# Patient Record
Sex: FEMALE | Race: WHITE | NOT HISPANIC OR LATINO | Employment: OTHER | URBAN - METROPOLITAN AREA
[De-identification: names, ages, dates, MRNs, and addresses within clinical notes are randomized per-mention and may not be internally consistent; named-entity substitution may affect disease eponyms.]

---

## 2017-01-11 ENCOUNTER — LAB (OUTPATIENT)
Dept: LAB | Facility: HOSPITAL | Age: 62
End: 2017-01-11

## 2017-01-11 DIAGNOSIS — Z79.899 ENCOUNTER FOR LONG-TERM (CURRENT) USE OF MEDICATIONS: Primary | ICD-10-CM

## 2017-01-11 DIAGNOSIS — IMO0002 HORMONE DEFICIENCY: ICD-10-CM

## 2017-01-11 DIAGNOSIS — L70.9 ACNE, UNSPECIFIED ACNE TYPE: ICD-10-CM

## 2017-01-11 LAB
ALBUMIN SERPL-MCNC: 4.4 G/DL (ref 3.2–4.8)
ALP SERPL-CCNC: 59 U/L (ref 25–100)
ALT SERPL W P-5'-P-CCNC: 18 U/L (ref 7–40)
AST SERPL-CCNC: 28 U/L (ref 0–33)
BILIRUB CONJ SERPL-MCNC: 0.1 MG/DL (ref 0–0.2)
BILIRUB INDIRECT SERPL-MCNC: 0.4 MG/DL (ref 0.1–1.1)
BILIRUB SERPL-MCNC: 0.5 MG/DL (ref 0.3–1.2)
CHOLEST SERPL-MCNC: 188 MG/DL (ref 0–200)
ESTRADIOL SERPL HS-MCNC: 21 PG/ML
FSH SERPL-ACNC: 143.7 MIU/ML
HCG INTACT+B SERPL-ACNC: <5 MIU/ML
PROGEST SERPL-MCNC: 0.42 NG/ML
PROT SERPL-MCNC: 7.2 G/DL (ref 5.7–8.2)
TRIGL SERPL-MCNC: 46 MG/DL (ref 0–150)

## 2017-01-11 PROCEDURE — 82670 ASSAY OF TOTAL ESTRADIOL: CPT | Performed by: SPECIALIST

## 2017-01-11 PROCEDURE — 80076 HEPATIC FUNCTION PANEL: CPT | Performed by: SPECIALIST

## 2017-01-11 PROCEDURE — 83001 ASSAY OF GONADOTROPIN (FSH): CPT | Performed by: SPECIALIST

## 2017-01-11 PROCEDURE — 84478 ASSAY OF TRIGLYCERIDES: CPT | Performed by: SPECIALIST

## 2017-01-11 PROCEDURE — 84144 ASSAY OF PROGESTERONE: CPT | Performed by: SPECIALIST

## 2017-01-11 PROCEDURE — 82465 ASSAY BLD/SERUM CHOLESTEROL: CPT | Performed by: SPECIALIST

## 2017-01-11 PROCEDURE — 36415 COLL VENOUS BLD VENIPUNCTURE: CPT

## 2017-01-11 PROCEDURE — 84702 CHORIONIC GONADOTROPIN TEST: CPT | Performed by: SPECIALIST

## 2017-01-26 ENCOUNTER — PROCEDURE VISIT (OUTPATIENT)
Dept: OBSTETRICS AND GYNECOLOGY | Facility: CLINIC | Age: 62
End: 2017-01-26

## 2017-01-26 VITALS
SYSTOLIC BLOOD PRESSURE: 118 MMHG | WEIGHT: 118 LBS | HEIGHT: 65 IN | DIASTOLIC BLOOD PRESSURE: 62 MMHG | BODY MASS INDEX: 19.66 KG/M2

## 2017-01-26 DIAGNOSIS — Z01.419 ENCOUNTER FOR GYNECOLOGICAL EXAMINATION WITHOUT ABNORMAL FINDING: Primary | ICD-10-CM

## 2017-01-26 PROCEDURE — 99396 PREV VISIT EST AGE 40-64: CPT | Performed by: OBSTETRICS & GYNECOLOGY

## 2017-01-26 NOTE — PROGRESS NOTES
Subjective   Chief Complaint   Patient presents with   • Gynecologic Exam   • Cyst     patient advised has cyst in vaginal area x 5 days, has been draining.      Brea Perez is a 61 y.o. year old  menopausal female presenting to be seen for her annual exam.  This past year she has not been on hormone replacement therapy.  There has not been vaginal bleeding in the last 12 months.  Menopausal symptoms are not present.  Pt with complaints of vulvar lesions that come and go intermittently; will vary from side to side.  Pt recently had lesion but has resolved.  Pt reports usually pea size with drainage but no blisters or vesicles.  Pt see's Dr. Mcbride.  Pt on Osphena now since September and reports doing well.  Pt had MMG done in November; reviewed and normal.  Pt also had dexa; reviewed with borderline osteopenia.  Pt on calcium and vit. D.      HEALTH Hx:  She exercises regularly: not asked.  She wears her seat belt:yes.  She has concerns about domestic violence: no.  She has noticed changes in height: no.  OTHER COMPLAINTS:  none    The following portions of the patient's history were reviewed and updated as appropriate:problem list, current medications, allergies, past family history, past medical history, past social history and past surgical history.    Smoking status: Former Smoker                                                              Packs/day: 0.50      Years: 10.00        Quit date: 1986  Smokeless status: Never Used                          Review of Systems  Consitutional NEG: anorexia or night sweats    POS: nothing reported   Gastointestinal NEG: bloating, change in bowel habits, melena or reflux symptoms    POS: nothing reported   Genitourinary NEG: dysuria or hematuria    POS: nothing reported   Integument NEG: moles that are changing in size, shape, color or rashes    POS: nothing reported   Breast NEG: persistent breast lump, skin dimpling or nipple discharge    POS: nothing  "reported        Objective   Visit Vitals   • /62   • Ht 65\" (165.1 cm)   • Wt 118 lb (53.5 kg)   • LMP  (Exact Date)   • Breastfeeding No   • BMI 19.64 kg/m2       General:  well developed; well nourished  no acute distress   Skin:  No suspicious lesions seen   Thyroid: normal to inspection and palpation   Breasts:  Examined in supine position  Symmetric without masses or skin dimpling  Nipples normal without inversion, lesions or discharge  There are no palpable axillary nodes   Abdomen: soft, non-tender; no masses  no umbilical or inginual hernias are present  no hepato-splenomegaly   Pelvis: Clinical staff was present for exam  External genitalia:  normal appearance of the external genitalia including Bartholin's and Lawton's glands.  :  urethral meatus normal; urethral hypermobility is absent.  Vaginal:  normal pink mucosa without prolapse or lesions.  Cervix:  normal appearance.  Uterus:  normal size, shape and consistency.  Adnexa:  normal bimanual exam of the adnexa.        Assessment /Plan    Brea was seen today for gynecologic exam and cyst.    Diagnoses and all orders for this visit:    Encounter for gynecological examination without abnormal finding  -     Pap IG, Rfx HPV ASCU     MMG done and reviewed as well as dexa.   Discussed calcium and vit d.    Follow up 1 year for annual exam           This note was electronically signed.    Odalys Agudelo M.D.  January 26, 2017      "

## 2017-01-26 NOTE — PATIENT INSTRUCTIONS
Menopause  Menopause is the normal time of life when menstrual periods stop completely. Menopause is complete when you have missed 12 consecutive menstrual periods. It usually occurs between the ages of 48 years and 55 years. Very rarely does a woman develop menopause before the age of 40 years. At menopause, your ovaries stop producing the female hormones estrogen and progesterone. This can cause undesirable symptoms and also affect your health. Sometimes the symptoms may occur 4-5 years before the menopause begins. There is no relationship between menopause and:  · Oral contraceptives.  · Number of children you had.  · Race.  · The age your menstrual periods started (menarche).  Heavy smokers and very thin women may develop menopause earlier in life.  CAUSES  · The ovaries stop producing the female hormones estrogen and progesterone.  · Other causes include:    Surgery to remove both ovaries.    The ovaries stop functioning for no known reason.    Tumors of the pituitary gland in the brain.    Medical disease that affects the ovaries and hormone production.    Radiation treatment to the abdomen or pelvis.    Chemotherapy that affects the ovaries.  SYMPTOMS   · Hot flashes.  · Night sweats.  · Decrease in sex drive.  · Vaginal dryness and thinning of the vagina causing painful intercourse.  · Dryness of the skin and developing wrinkles.  · Headaches.  · Tiredness.  · Irritability.  · Memory problems.  · Weight gain.  · Bladder infections.  · Hair growth of the face and chest.  · Infertility.  More serious symptoms include:  · Loss of bone (osteoporosis) causing breaks (fractures).  · Depression.  · Hardening and narrowing of the arteries (atherosclerosis) causing heart attacks and strokes.  DIAGNOSIS   · When the menstrual periods have stopped for 12 straight months.  · Physical exam.  · Hormone studies of the blood.  TREATMENT   There are many treatment choices and nearly as many questions about them. The  decisions to treat or not to treat menopausal changes is an individual choice made with your health care provider. Your health care provider can discuss the treatments with you. Together, you can decide which treatment will work best for you. Your treatment choices may include:   · Hormone therapy (estrogen and progesterone).  · Non-hormonal medicines.  · Treating the individual symptoms with medicine (for example antidepressants for depression).  · Herbal medicines that may help specific symptoms.  · Counseling by a psychiatrist or psychologist.  · Group therapy.  · Lifestyle changes including:    Eating healthy.    Regular exercise.    Limiting caffeine and alcohol.    Stress management and meditation.  · No treatment.  HOME CARE INSTRUCTIONS   · Take the medicine your health care provider gives you as directed.  · Get plenty of sleep and rest.  · Exercise regularly.  · Eat a diet that contains calcium (good for the bones) and soy products (acts like estrogen hormone).  · Avoid alcoholic beverages.  · Do not smoke.  · If you have hot flashes, dress in layers.  · Take supplements, calcium, and vitamin D to strengthen bones.  · You can use over-the-counter lubricants or moisturizers for vaginal dryness.  · Group therapy is sometimes very helpful.  · Acupuncture may be helpful in some cases.  SEEK MEDICAL CARE IF:   · You are not sure you are in menopause.  · You are having menopausal symptoms and need advice and treatment.  · You are still having menstrual periods after age 55 years.  · You have pain with intercourse.  · Menopause is complete (no menstrual period for 12 months) and you develop vaginal bleeding.  · You need a referral to a specialist (gynecologist, psychiatrist, or psychologist) for treatment.  SEEK IMMEDIATE MEDICAL CARE IF:   · You have severe depression.  · You have excessive vaginal bleeding.  · You fell and think you have a broken bone.  · You have pain when you urinate.  · You develop leg or  chest pain.  · You have a fast pounding heart beat (palpitations).  · You have severe headaches.  · You develop vision problems.  · You feel a lump in your breast.  · You have abdominal pain or severe indigestion.     This information is not intended to replace advice given to you by your health care provider. Make sure you discuss any questions you have with your health care provider.     Document Released: 03/09/2005 Document Revised: 08/20/2014 Document Reviewed: 07/17/2014  [employer] Interactive Patient Education ©2016 [employer]

## 2017-01-26 NOTE — MR AVS SNAPSHOT
Brea Perez   1/26/2017 1:30 PM   Procedure visit    Dept Phone:  [phone]   Encounter #:  [id number]    Provider:  Odalys Agudelo MD   Department:  [facility]                Your Full Care Plan              Your Updated Medication List          This list is accurate as of: 1/26/17  2:16 PM.  Always use your most recent med list.                ABSORICA 10 MG capsule   Generic drug:  ISOtretinoin       busPIRone 7.5 MG tablet   Commonly known as:  BUSPAR   Take 1 tablet by mouth 2 (two) times a day.       CALCIUM 600 + D PO       DULoxetine 60 MG capsule   Commonly known as:  CYMBALTA   take 1 capsule by mouth twice a day       * OCUVITE ADULT 50+ PO       * CENTRUM SILVER ADULT 50+ PO       Omega-3 350 MG capsule delayed-release       Ospemifene 60 MG tablet   Take 1 tablet by mouth daily.       traZODone 100 MG tablet   Commonly known as:  DESYREL   take 1 tablet by mouth at bedtime if needed       vitamin B-12 1000 MCG tablet   Commonly known as:  CYANOCOBALAMIN       * Notice:  This list has 2 medication(s) that are the same as other medications prescribed for you. Read the directions carefully, and ask your doctor or other care provider to review them with you.            We Performed the Following     Pap IG, Rfx HPV ASCU       You Were Diagnosed With        Codes Comments    Encounter for gynecological examination without abnormal finding    -  Primary ICD-10-CM: Z01.419  ICD-9-CM: V72.31       Instructions    Menopause  Menopause is the normal time of life when menstrual periods stop completely. Menopause is complete when you have missed 12 consecutive menstrual periods. It usually occurs between the ages of 48 years and 55 years. Very rarely does a woman develop menopause before the age of 40 years. At menopause, your ovaries stop producing the female hormones estrogen and progesterone. This can cause undesirable symptoms and also  affect your health. Sometimes the symptoms may occur 4-5 years before the menopause begins. There is no relationship between menopause and:  · Oral contraceptives.  · Number of children you had.  · Race.  · The age your menstrual periods started (menarche).  Heavy smokers and very thin women may develop menopause earlier in life.  CAUSES  · The ovaries stop producing the female hormones estrogen and progesterone.  · Other causes include:    Surgery to remove both ovaries.    The ovaries stop functioning for no known reason.    Tumors of the pituitary gland in the brain.    Medical disease that affects the ovaries and hormone production.    Radiation treatment to the abdomen or pelvis.    Chemotherapy that affects the ovaries.  SYMPTOMS   · Hot flashes.  · Night sweats.  · Decrease in sex drive.  · Vaginal dryness and thinning of the vagina causing painful intercourse.  · Dryness of the skin and developing wrinkles.  · Headaches.  · Tiredness.  · Irritability.  · Memory problems.  · Weight gain.  · Bladder infections.  · Hair growth of the face and chest.  · Infertility.  More serious symptoms include:  · Loss of bone (osteoporosis) causing breaks (fractures).  · Depression.  · Hardening and narrowing of the arteries (atherosclerosis) causing heart attacks and strokes.  DIAGNOSIS   · When the menstrual periods have stopped for 12 straight months.  · Physical exam.  · Hormone studies of the blood.  TREATMENT   There are many treatment choices and nearly as many questions about them. The decisions to treat or not to treat menopausal changes is an individual choice made with your health care provider. Your health care provider can discuss the treatments with you. Together, you can decide which treatment will work best for you. Your treatment choices may include:   · Hormone therapy (estrogen and progesterone).  · Non-hormonal medicines.  · Treating the individual symptoms with medicine (for example antidepressants for  depression).  · Herbal medicines that may help specific symptoms.  · Counseling by a psychiatrist or psychologist.  · Group therapy.  · Lifestyle changes including:    Eating healthy.    Regular exercise.    Limiting caffeine and alcohol.    Stress management and meditation.  · No treatment.  HOME CARE INSTRUCTIONS   · Take the medicine your health care provider gives you as directed.  · Get plenty of sleep and rest.  · Exercise regularly.  · Eat a diet that contains calcium (good for the bones) and soy products (acts like estrogen hormone).  · Avoid alcoholic beverages.  · Do not smoke.  · If you have hot flashes, dress in layers.  · Take supplements, calcium, and vitamin D to strengthen bones.  · You can use over-the-counter lubricants or moisturizers for vaginal dryness.  · Group therapy is sometimes very helpful.  · Acupuncture may be helpful in some cases.  SEEK MEDICAL CARE IF:   · You are not sure you are in menopause.  · You are having menopausal symptoms and need advice and treatment.  · You are still having menstrual periods after age 55 years.  · You have pain with intercourse.  · Menopause is complete (no menstrual period for 12 months) and you develop vaginal bleeding.  · You need a referral to a specialist (gynecologist, psychiatrist, or psychologist) for treatment.  SEEK IMMEDIATE MEDICAL CARE IF:   · You have severe depression.  · You have excessive vaginal bleeding.  · You fell and think you have a broken bone.  · You have pain when you urinate.  · You develop leg or chest pain.  · You have a fast pounding heart beat (palpitations).  · You have severe headaches.  · You develop vision problems.  · You feel a lump in your breast.  · You have abdominal pain or severe indigestion.     This information is not intended to replace advice given to you by your health care provider. Make sure you discuss any questions you have with your health care provider.     Document Released: 03/09/2005 Document Revised:  "2014 Document Reviewed: 2014  [employer] Interactive Patient Education © [employer] Inc.       Patient Instructions History      Upcoming Appointments     Visit Type Date Time Department    PAP SMEAR/PELVIC EXAM 2017  1:30 PM ANJUM [place] Signup     [facility] allows you to send messages to your doctor, view your test results, renew your prescriptions, schedule appointments, and more. To sign up, go to [employer] and click on the Sign Up Now link in the New User? box. Enter your [employer] Activation Code exactly as it appears below along with the last four digits of your Social Security Number and your Date of Birth () to complete the sign-up process. If you do not sign up before the expiration date, you must request a new code.    [employer] Activation Code: [email or web address]  Expires: 2017  2:16 PM    If you have questions, you can email [email or web address] or call [phone] to talk to our [employer] staff. Remember, [employer] is NOT to be used for urgent needs. For medical emergencies, dial 911.               Other Info from Your Visit           Your Appointments     2018  1:30 PM EST   Annual with Odalys Agudelo MD   [facility] (--)    [address] [address] [phone]              Allergies     No Known Allergies      Reason for Visit     Gynecologic Exam     Cyst patient advised has cyst in vaginal area x 5 days, has been draining.       Vital Signs     Blood Pressure Height Weight Last Menstrual Period Breastfeeding? Body Mass Index    118/62 65\" (165.1 cm) 118 lb (53.5 kg) (Exact Date) No 19.64 kg/m2    Smoking Status                   Former Smoker           Problems and Diagnoses Noted     Encounter for routine gynecological examination    -  Primary        "

## 2017-03-09 ENCOUNTER — TRANSCRIBE ORDERS (OUTPATIENT)
Dept: LAB | Facility: HOSPITAL | Age: 62
End: 2017-03-09

## 2017-03-09 ENCOUNTER — LAB (OUTPATIENT)
Dept: LAB | Facility: HOSPITAL | Age: 62
End: 2017-03-09

## 2017-03-09 DIAGNOSIS — Z79.899 ENCOUNTER FOR LONG-TERM (CURRENT) USE OF HIGH-RISK MEDICATION: ICD-10-CM

## 2017-03-09 DIAGNOSIS — L70.9 ACNE, UNSPECIFIED ACNE TYPE: ICD-10-CM

## 2017-03-09 DIAGNOSIS — Z79.899 ENCOUNTER FOR LONG-TERM (CURRENT) USE OF HIGH-RISK MEDICATION: Primary | ICD-10-CM

## 2017-03-09 LAB
ALBUMIN SERPL-MCNC: 4.7 G/DL (ref 3.2–4.8)
ALP SERPL-CCNC: 65 U/L (ref 25–100)
ALT SERPL W P-5'-P-CCNC: 17 U/L (ref 7–40)
AST SERPL-CCNC: 30 U/L (ref 0–33)
BILIRUB CONJ SERPL-MCNC: 0.1 MG/DL (ref 0–0.2)
BILIRUB INDIRECT SERPL-MCNC: 0.4 MG/DL (ref 0.1–1.1)
BILIRUB SERPL-MCNC: 0.5 MG/DL (ref 0.3–1.2)
CHOLEST SERPL-MCNC: 251 MG/DL (ref 0–200)
PROT SERPL-MCNC: 7.5 G/DL (ref 5.7–8.2)
TRIGL SERPL-MCNC: 54 MG/DL (ref 0–150)

## 2017-03-09 PROCEDURE — 84478 ASSAY OF TRIGLYCERIDES: CPT | Performed by: SPECIALIST

## 2017-03-09 PROCEDURE — 80076 HEPATIC FUNCTION PANEL: CPT | Performed by: SPECIALIST

## 2017-03-09 PROCEDURE — 36415 COLL VENOUS BLD VENIPUNCTURE: CPT

## 2017-03-09 PROCEDURE — 82465 ASSAY BLD/SERUM CHOLESTEROL: CPT | Performed by: SPECIALIST

## 2017-04-02 DIAGNOSIS — F41.9 ANXIETY: ICD-10-CM

## 2017-04-03 RX ORDER — BUSPIRONE HYDROCHLORIDE 7.5 MG/1
TABLET ORAL
Qty: 60 TABLET | Refills: 5 | Status: SHIPPED | OUTPATIENT
Start: 2017-04-03 | End: 2017-08-16 | Stop reason: SDUPTHER

## 2017-06-13 ENCOUNTER — OFFICE VISIT (OUTPATIENT)
Dept: INTERNAL MEDICINE | Facility: CLINIC | Age: 62
End: 2017-06-13

## 2017-06-13 VITALS
HEIGHT: 65 IN | WEIGHT: 119.2 LBS | OXYGEN SATURATION: 99 % | HEART RATE: 61 BPM | RESPIRATION RATE: 12 BRPM | SYSTOLIC BLOOD PRESSURE: 112 MMHG | BODY MASS INDEX: 19.86 KG/M2 | DIASTOLIC BLOOD PRESSURE: 70 MMHG

## 2017-06-13 DIAGNOSIS — F41.9 ANXIETY: ICD-10-CM

## 2017-06-13 DIAGNOSIS — R53.83 OTHER FATIGUE: ICD-10-CM

## 2017-06-13 DIAGNOSIS — F33.1 MODERATE EPISODE OF RECURRENT MAJOR DEPRESSIVE DISORDER (HCC): Primary | ICD-10-CM

## 2017-06-13 DIAGNOSIS — N94.10 DYSPAREUNIA, FEMALE: ICD-10-CM

## 2017-06-13 DIAGNOSIS — Z00.00 HEALTHCARE MAINTENANCE: ICD-10-CM

## 2017-06-13 PROBLEM — R79.89 HIGH SERUM HIGH DENSITY LIPOPROTEIN (HDL): Status: ACTIVE | Noted: 2017-06-13

## 2017-06-13 LAB
BUN SERPL-MCNC: 14 MG/DL (ref 7–20)
BUN/CREAT SERPL: 20 (ref 7.1–23.5)
CALCIUM SERPL-MCNC: 10 MG/DL (ref 8.4–10.2)
CHLORIDE SERPL-SCNC: 101 MMOL/L (ref 98–107)
CO2 SERPL-SCNC: 29 MMOL/L (ref 26–30)
CREAT SERPL-MCNC: 0.7 MG/DL (ref 0.6–1.3)
ERYTHROCYTE [DISTWIDTH] IN BLOOD BY AUTOMATED COUNT: 12.5 % (ref 11.5–14.5)
FERRITIN SERPL-MCNC: 67.3 NG/ML (ref 11.1–264)
GLUCOSE SERPL-MCNC: 92 MG/DL (ref 74–98)
HCT VFR BLD AUTO: 40 % (ref 37–47)
HGB BLD-MCNC: 13.1 G/DL (ref 12–16)
IRON SATN MFR SERPL: 70 % (ref 11–46)
IRON SERPL-MCNC: 202 MCG/DL (ref 37–181)
MCH RBC QN AUTO: 30.8 PG (ref 27–31)
MCHC RBC AUTO-ENTMCNC: 32.8 G/DL (ref 30–37)
MCV RBC AUTO: 94.1 FL (ref 81–99)
PLATELET # BLD AUTO: 290 10*3/MM3 (ref 130–400)
POTASSIUM SERPL-SCNC: 4.9 MMOL/L (ref 3.5–5.1)
RBC # BLD AUTO: 4.25 10*6/MM3 (ref 4.2–5.4)
SODIUM SERPL-SCNC: 142 MMOL/L (ref 137–145)
TIBC SERPL-MCNC: 290 MCG/DL (ref 261–497)
TSH SERPL DL<=0.005 MIU/L-ACNC: 2.95 MIU/ML (ref 0.47–4.68)
UIBC SERPL-MCNC: 88 MCG/DL
VIT B12 SERPL-MCNC: >1000 PG/ML (ref 239–931)
WBC # BLD AUTO: 4.05 10*3/MM3 (ref 4.8–10.8)

## 2017-06-13 PROCEDURE — 99396 PREV VISIT EST AGE 40-64: CPT | Performed by: FAMILY MEDICINE

## 2017-06-13 PROCEDURE — 99213 OFFICE O/P EST LOW 20 MIN: CPT | Performed by: FAMILY MEDICINE

## 2017-06-13 RX ORDER — DULOXETINE 40 MG/1
40 CAPSULE, DELAYED RELEASE ORAL 2 TIMES DAILY
Qty: 28 CAPSULE | Refills: 0 | Status: SHIPPED | OUTPATIENT
Start: 2017-06-13 | End: 2017-07-17 | Stop reason: SDUPTHER

## 2017-06-13 RX ORDER — ISOTRETINOIN 20 MG/1
CAPSULE ORAL
Refills: 0 | COMMUNITY
Start: 2017-05-23 | End: 2019-02-05

## 2017-06-13 NOTE — PROGRESS NOTES
Annual physical.   She would like ears cleaned out.   Last refill she got at pharmacy, got an alert that she could be eligible for shingles and pneumonia shots. She would like to discuss this.   Discuss treatment for genital herpes.  dx'd last year after bx was done. She also has sores under nose.     Loretta Perez is a 61 y.o. female and is here for a comprehensive physical exam. The patient reports problems - depression.    Genital sore: has a sore come up about once a month.  Not painful at all, not burning, not tender.  Her  tested positive w/ swab.      B/l ear fullness     Loretta Perez is a 61 y.o. female who presents for follow up of depression.  At her last visit anti-depressant medication was continued.   She complains of the following side effects from the treatment: none.  Symptoms have been gradually worsening since starting treatment.  Depression symptoms still include depressed mood, anhedonia, psychomotor retardation, fatigue, difficulty concentrating, anxiety, loss of energy/fatigue, disturbed sleep and decreased labido.  Patient denies psychomotor agitation, panic attacks, weight loss, weight gain, increased appetite and decreased appetite.  Previous treatment includes: medication - had been on zoloft in the past, seemed to work pretty well.  She had stopped trazodone, zoloft and buspar when she retired, but worsened.  She was switched to cymbalta.     Do you take any herbs or supplements that were not prescribed by a doctor? yes  Are you taking calcium supplements? Yes  Are you taking aspirin daily? N/A     History:  LMP: No LMP recorded (exact date). Patient is postmenopausal.  Menopause: Yes  Last pap date: 2017  Abnormal pap? no         OB History    Para Term  AB SAB TAB Ectopic Multiple Living   3 2 2  1     2      # Outcome Date GA Lbr Raymundo/2nd Weight Sex Delivery Anes PTL Lv   3 Term     M Vag-Spont   Y   2 Term     F Vag-Spont    Y   1 AB                     The following portions of the patient's history were reviewed and updated as appropriate: She  has a past medical history of Anxiety; Depression; Essential hypertension; History of colon polyps; and Recurrent urinary tract infection.  She has Moderate episode of recurrent major depressive disorder; Insomnia; Anxiety; Dyspareunia, female; and High serum high density lipoprotein (HDL) on her pertinent problem list.  She  has a past surgical history that includes Tonsillectomy; Cholecystectomy; LASIK (2008); Tubal ligation; Small intestine surgery (2003); and Small intestine surgery (2011).  Her family history includes Alcohol abuse in her paternal grandfather; Alzheimer's disease in her father; Arthritis in her mother; COPD in her maternal grandfather and paternal grandfather; Cancer in her father, maternal grandmother, maternal uncle, and mother; Cerebral aneurysm in her paternal grandmother; Colon cancer in her maternal grandmother; Dementia in her mother; Hyperlipidemia in her mother; Hypertension in her father and mother; No Known Problems in her brother and sister; Thyroid disease in her mother.  She  reports that she quit smoking about 31 years ago. She has a 5.00 pack-year smoking history. She has never used smokeless tobacco. She reports that she drinks about 7.2 oz of alcohol per week  She reports that she does not use illicit drugs..    Review of Systems  Do you have pain that bothers you in your daily life? no    Review of Systems   Constitutional: Negative for appetite change, chills, fatigue, fever and unexpected weight change.        No malaise   HENT: Negative for ear pain, hearing loss, nosebleeds, rhinorrhea, sore throat, trouble swallowing and voice change.    Eyes: Negative for pain, discharge, redness, itching and visual disturbance.        No dry eyes   Respiratory: Negative for cough, shortness of breath and wheezing.         No SOB with exertion  No SOB lying down  "  Cardiovascular: Negative for chest pain, palpitations and leg swelling.        No leg cramps     Gastrointestinal: Negative for abdominal pain, blood in stool, constipation, diarrhea, nausea and vomiting.        No change in bowel habits  No heartburn   Endocrine: Negative for cold intolerance, heat intolerance, polydipsia, polyphagia and polyuria.        No hot flashes  No muscle weakness  No feeling of weakness   Genitourinary: Positive for dyspareunia. Negative for difficulty urinating, dysuria, frequency, hematuria, menstrual problem, pelvic pain, urgency, vaginal discharge and vaginal pain.        No urinary incontinence  No change in periods   Musculoskeletal: Negative for arthralgias, joint swelling and myalgias.        No limb pain  No limb swelling   Skin: Negative for rash and wound.        No rash  No lesions  No change in mole  No itching   Neurological: Negative for dizziness, seizures, syncope, weakness, numbness and headaches.        No confusion  No tingling  No trouble walking   Hematological: Negative for adenopathy. Does not bruise/bleed easily.   Psychiatric/Behavioral: Positive for dysphoric mood. Negative for confusion, sleep disturbance and suicidal ideas. The patient is nervous/anxious.         No change in personality         Objective   /70  Pulse 61  Resp 12  Ht 65\" (165.1 cm)  Wt 119 lb 3.2 oz (54.1 kg)  LMP  (Exact Date)  SpO2 99%  BMI 19.84 kg/m2    Physical Exam   Constitutional: She is oriented to person, place, and time. She appears well-developed and well-nourished. No distress.   HENT:   Head: Normocephalic and atraumatic.   Right Ear: Tympanic membrane, external ear and ear canal normal.   Left Ear: Tympanic membrane, external ear and ear canal normal.   Nose: No rhinorrhea.   Mouth/Throat: Uvula is midline, oropharynx is clear and moist and mucous membranes are normal. No posterior oropharyngeal erythema. No tonsillar exudate.   Eyes: Conjunctivae and lids are " normal. Pupils are equal, round, and reactive to light. Right eye exhibits no discharge. Left eye exhibits no discharge. No scleral icterus.   Neck: Trachea normal, normal range of motion and phonation normal. Neck supple. No thyroid mass and no thyromegaly present.   Cardiovascular: Normal rate, regular rhythm and normal heart sounds.    No murmur heard.  Pulmonary/Chest: Effort normal and breath sounds normal.   Abdominal: Soft. Normal appearance. There is no splenomegaly or hepatomegaly. There is no tenderness. No hernia.   Musculoskeletal: Normal range of motion. She exhibits no edema, tenderness or deformity.   Lymphadenopathy:        Head (right side): No submental, no submandibular, no preauricular and no posterior auricular adenopathy present.        Head (left side): No submental, no submandibular, no preauricular and no posterior auricular adenopathy present.     She has no cervical adenopathy.        Right: No supraclavicular adenopathy present.        Left: No supraclavicular adenopathy present.   Neurological: She is alert and oriented to person, place, and time. She has normal strength.   Reflex Scores:       Patellar reflexes are 2+ on the right side and 2+ on the left side.  Skin: Skin is warm and dry. No rash noted. No pallor.   Psychiatric: Her speech is normal and behavior is normal. Judgment and thought content normal. She exhibits a depressed mood.          Assessment/Plan   Healthy female exam.     1. 1. Moderate episode of recurrent major depressive disorder  Will start weaning cymbalta - 1 cap BID, then if tolerated, decrease to 30 mg BID or 1 40 mg daily.  - DULoxetine 40 MG capsule delayed-release particles; Take 1 capsule by mouth 2 (Two) Times a Day.  Dispense: 28 capsule; Refill: 0  - sertraline (ZOLOFT) 50 MG tablet; Take 1 tablet by mouth Daily.  Dispense: 30 tablet; Refill: 1    2. Dyspareunia, female  stable  - Ospemifene 60 MG tablet; Take 1 tablet by mouth Daily.  Dispense: 30  tablet; Refill: 11    3. Healthcare maintenance    - Basic Metabolic Panel    4. Other fatigue  worsening  - TSH  - CBC (No Diff)  - Ferritin  - Vitamin B12  - Iron Profile    5. Anxiety  worsening  - DULoxetine 40 MG capsule delayed-release particles; Take 1 capsule by mouth 2 (Two) Times a Day.  Dispense: 28 capsule; Refill: 0  - sertraline (ZOLOFT) 50 MG tablet; Take 1 tablet by mouth Daily.  Dispense: 30 tablet; Refill: 1    2. Patient Counseling:  --Nutrition: Stressed importance of moderation in sodium/caffeine intake, saturated fat and cholesterol, caloric balance, sufficient intake of fresh fruits, vegetables, fiber, calcium, iron, and 1 g folate supplementation if of childbearing age.   --Discussed the issue of calcium supplement, and the daily use of baby aspirin if applicable.  --Exercise: Stressed the importance of regular exercise.   --Substance Abuse: Discussed cessation/primary prevention of tobacco (if applicable), alcohol, or other drug use (if applicable); driving or other dangerous activities under the influence; availability of treatment for abuse.    --Sexuality: Discussed sexually transmitted diseases, partner selection, use of condoms, avoidance of unintended pregnancy  and contraceptive alternatives.   --Injury prevention: Discussed safety belts, safety helmets, smoke detector, smoking near bedding or upholstery.   --Dental health: Discussed importance of regular tooth brushing, flossing, and dental visits.  --Immunizations reviewed.  --Discussed benefits of screening colonoscopy (if applicable).  --After hours service discussed with patient    3. Discussed the patient's BMI with her.  The BMI is in the acceptable range  4. Follow up in 1 month

## 2017-07-14 ENCOUNTER — PATIENT MESSAGE (OUTPATIENT)
Dept: INTERNAL MEDICINE | Facility: CLINIC | Age: 62
End: 2017-07-14

## 2017-07-14 DIAGNOSIS — F33.1 MODERATE EPISODE OF RECURRENT MAJOR DEPRESSIVE DISORDER (HCC): ICD-10-CM

## 2017-07-14 DIAGNOSIS — F41.9 ANXIETY: ICD-10-CM

## 2017-07-17 RX ORDER — SERTRALINE HYDROCHLORIDE 100 MG/1
100 TABLET, FILM COATED ORAL DAILY
Qty: 30 TABLET | Refills: 1 | Status: SHIPPED | OUTPATIENT
Start: 2017-07-17 | End: 2017-08-16 | Stop reason: SDUPTHER

## 2017-07-17 RX ORDER — DULOXETINE 40 MG/1
40 CAPSULE, DELAYED RELEASE ORAL DAILY
Qty: 30 CAPSULE | Refills: 0 | Status: SHIPPED | OUTPATIENT
Start: 2017-07-17 | End: 2017-08-16

## 2017-07-17 NOTE — TELEPHONE ENCOUNTER
Katelynn Osei MA 7/14/2017 4:42 PM EDT        ----- Message -----   From: Brea Perez   Sent: 7/14/2017 11:24 AM   To: [facility]  Subject: Non-Urgent Medical Question     I was letting you know that the transition of the meds has been going well.  There weren't any refills left on the 40 mg of Duloxetine, so I had some 60 mg leftover from a previous prescription and have bee taking it once a day.   I have one refill left on the 50mg of Sertraline which I have been taking once  a day as well. I didn't know to get the refill or if you were going to 'up'  the mg level on it and lower the mg on the Duloxetine.    Thanks,    Brea Perez

## 2017-08-16 ENCOUNTER — OFFICE VISIT (OUTPATIENT)
Dept: INTERNAL MEDICINE | Facility: CLINIC | Age: 62
End: 2017-08-16

## 2017-08-16 VITALS
TEMPERATURE: 98.6 F | WEIGHT: 113.25 LBS | HEART RATE: 90 BPM | HEIGHT: 65 IN | DIASTOLIC BLOOD PRESSURE: 80 MMHG | SYSTOLIC BLOOD PRESSURE: 120 MMHG | BODY MASS INDEX: 18.87 KG/M2 | OXYGEN SATURATION: 98 %

## 2017-08-16 DIAGNOSIS — F33.1 MODERATE EPISODE OF RECURRENT MAJOR DEPRESSIVE DISORDER (HCC): Primary | ICD-10-CM

## 2017-08-16 DIAGNOSIS — F41.9 ANXIETY: ICD-10-CM

## 2017-08-16 PROCEDURE — 99214 OFFICE O/P EST MOD 30 MIN: CPT | Performed by: FAMILY MEDICINE

## 2017-08-16 RX ORDER — BUSPIRONE HYDROCHLORIDE 10 MG/1
10 TABLET ORAL 2 TIMES DAILY
Qty: 60 TABLET | Refills: 5 | Status: SHIPPED | OUTPATIENT
Start: 2017-08-16 | End: 2018-06-28 | Stop reason: SDUPTHER

## 2017-08-16 RX ORDER — SERTRALINE HYDROCHLORIDE 100 MG/1
150 TABLET, FILM COATED ORAL DAILY
Qty: 45 TABLET | Refills: 2 | Status: SHIPPED | OUTPATIENT
Start: 2017-08-16 | End: 2017-09-26 | Stop reason: SDUPTHER

## 2017-08-16 RX ORDER — DULOXETIN HYDROCHLORIDE 20 MG/1
20 CAPSULE, DELAYED RELEASE ORAL EVERY OTHER DAY
Qty: 14 CAPSULE | Refills: 0 | Status: SHIPPED | OUTPATIENT
Start: 2017-08-16 | End: 2017-09-26

## 2017-08-30 DIAGNOSIS — R30.0 DYSURIA: Primary | ICD-10-CM

## 2017-08-30 PROCEDURE — 81003 URINALYSIS AUTO W/O SCOPE: CPT | Performed by: FAMILY MEDICINE

## 2017-09-05 ENCOUNTER — PATIENT MESSAGE (OUTPATIENT)
Dept: INTERNAL MEDICINE | Facility: CLINIC | Age: 62
End: 2017-09-05

## 2017-09-05 ENCOUNTER — CLINICAL SUPPORT (OUTPATIENT)
Dept: INTERNAL MEDICINE | Facility: CLINIC | Age: 62
End: 2017-09-05

## 2017-09-05 DIAGNOSIS — R39.9 URINARY SYMPTOM OR SIGN: Primary | ICD-10-CM

## 2017-09-05 LAB
BILIRUB BLD-MCNC: NEGATIVE MG/DL
CLARITY, POC: ABNORMAL
COLOR UR: ABNORMAL
GLUCOSE UR STRIP-MCNC: NEGATIVE MG/DL
KETONES UR QL: NEGATIVE
LEUKOCYTE EST, POC: ABNORMAL
NITRITE UR-MCNC: POSITIVE MG/ML
PH UR: 5 [PH] (ref 5–8)
PROT UR STRIP-MCNC: ABNORMAL MG/DL
RBC # UR STRIP: ABNORMAL /UL
SP GR UR: 1.02 (ref 1–1.03)
UROBILINOGEN UR QL: NORMAL

## 2017-09-05 RX ORDER — NITROFURANTOIN 25; 75 MG/1; MG/1
100 CAPSULE ORAL 2 TIMES DAILY
Qty: 20 CAPSULE | Refills: 0 | Status: SHIPPED | OUTPATIENT
Start: 2017-09-05 | End: 2017-09-15

## 2017-09-08 LAB
BACTERIA UR CULT: ABNORMAL
BACTERIA UR CULT: ABNORMAL
OTHER ANTIBIOTIC SUSC ISLT: ABNORMAL

## 2017-09-26 ENCOUNTER — OFFICE VISIT (OUTPATIENT)
Dept: INTERNAL MEDICINE | Facility: CLINIC | Age: 62
End: 2017-09-26

## 2017-09-26 VITALS
WEIGHT: 115 LBS | BODY MASS INDEX: 19.16 KG/M2 | HEART RATE: 68 BPM | DIASTOLIC BLOOD PRESSURE: 64 MMHG | RESPIRATION RATE: 12 BRPM | HEIGHT: 65 IN | SYSTOLIC BLOOD PRESSURE: 114 MMHG | OXYGEN SATURATION: 98 %

## 2017-09-26 DIAGNOSIS — F33.1 MODERATE EPISODE OF RECURRENT MAJOR DEPRESSIVE DISORDER (HCC): ICD-10-CM

## 2017-09-26 DIAGNOSIS — F41.9 ANXIETY: ICD-10-CM

## 2017-09-26 DIAGNOSIS — F51.04 PSYCHOPHYSIOLOGICAL INSOMNIA: ICD-10-CM

## 2017-09-26 DIAGNOSIS — Z23 FLU VACCINE NEED: Primary | ICD-10-CM

## 2017-09-26 PROCEDURE — 90686 IIV4 VACC NO PRSV 0.5 ML IM: CPT | Performed by: FAMILY MEDICINE

## 2017-09-26 PROCEDURE — 90471 IMMUNIZATION ADMIN: CPT | Performed by: FAMILY MEDICINE

## 2017-09-26 PROCEDURE — 99214 OFFICE O/P EST MOD 30 MIN: CPT | Performed by: FAMILY MEDICINE

## 2017-09-26 RX ORDER — SERTRALINE HYDROCHLORIDE 100 MG/1
100 TABLET, FILM COATED ORAL DAILY
Qty: 30 TABLET | Refills: 2 | Status: SHIPPED | OUTPATIENT
Start: 2017-09-26 | End: 2017-10-31 | Stop reason: SDUPTHER

## 2017-09-26 RX ORDER — ESZOPICLONE 2 MG/1
2 TABLET, FILM COATED ORAL NIGHTLY
Qty: 30 TABLET | Refills: 1 | Status: SHIPPED | OUTPATIENT
Start: 2017-09-26 | End: 2017-10-31

## 2017-09-26 RX ORDER — MELATONIN
1000 DAILY
COMMUNITY
Start: 2016-06-14 | End: 2021-02-10

## 2017-10-31 ENCOUNTER — OFFICE VISIT (OUTPATIENT)
Dept: INTERNAL MEDICINE | Facility: CLINIC | Age: 62
End: 2017-10-31

## 2017-10-31 VITALS
RESPIRATION RATE: 12 BRPM | OXYGEN SATURATION: 97 % | HEART RATE: 67 BPM | BODY MASS INDEX: 18.99 KG/M2 | WEIGHT: 114 LBS | DIASTOLIC BLOOD PRESSURE: 70 MMHG | SYSTOLIC BLOOD PRESSURE: 110 MMHG | HEIGHT: 65 IN

## 2017-10-31 DIAGNOSIS — Z23 NEED FOR SHINGLES VACCINE: ICD-10-CM

## 2017-10-31 DIAGNOSIS — F51.04 PSYCHOPHYSIOLOGICAL INSOMNIA: ICD-10-CM

## 2017-10-31 DIAGNOSIS — Z12.31 ENCOUNTER FOR SCREENING MAMMOGRAM FOR MALIGNANT NEOPLASM OF BREAST: ICD-10-CM

## 2017-10-31 DIAGNOSIS — F41.9 ANXIETY: ICD-10-CM

## 2017-10-31 DIAGNOSIS — A60.04 HERPES SIMPLEX VULVOVAGINITIS: Primary | ICD-10-CM

## 2017-10-31 DIAGNOSIS — F33.1 MODERATE EPISODE OF RECURRENT MAJOR DEPRESSIVE DISORDER (HCC): ICD-10-CM

## 2017-10-31 PROCEDURE — 90471 IMMUNIZATION ADMIN: CPT | Performed by: FAMILY MEDICINE

## 2017-10-31 PROCEDURE — 90736 HZV VACCINE LIVE SUBQ: CPT | Performed by: FAMILY MEDICINE

## 2017-10-31 PROCEDURE — 99214 OFFICE O/P EST MOD 30 MIN: CPT | Performed by: FAMILY MEDICINE

## 2017-10-31 RX ORDER — VALACYCLOVIR HYDROCHLORIDE 500 MG/1
500 TABLET, FILM COATED ORAL DAILY
Qty: 90 TABLET | Refills: 3 | Status: SHIPPED | OUTPATIENT
Start: 2017-10-31 | End: 2018-06-28 | Stop reason: SDUPTHER

## 2017-10-31 RX ORDER — SERTRALINE HYDROCHLORIDE 100 MG/1
100 TABLET, FILM COATED ORAL DAILY
Qty: 30 TABLET | Refills: 4 | Status: SHIPPED | OUTPATIENT
Start: 2017-10-31 | End: 2018-02-28

## 2017-10-31 RX ORDER — TRAZODONE HYDROCHLORIDE 100 MG/1
TABLET ORAL
Refills: 0 | COMMUNITY
Start: 2017-10-04 | End: 2017-10-31 | Stop reason: SDUPTHER

## 2017-10-31 RX ORDER — TRAZODONE HYDROCHLORIDE 100 MG/1
100 TABLET ORAL NIGHTLY
Qty: 30 TABLET | Refills: 5 | Status: SHIPPED | OUTPATIENT
Start: 2017-10-31 | End: 2018-06-28 | Stop reason: SDUPTHER

## 2017-11-15 ENCOUNTER — HOSPITAL ENCOUNTER (OUTPATIENT)
Dept: MAMMOGRAPHY | Facility: HOSPITAL | Age: 62
Discharge: HOME | End: 2017-11-15
Attending: FAMILY MEDICINE | Admitting: FAMILY MEDICINE

## 2017-11-15 ENCOUNTER — APPOINTMENT (OUTPATIENT)
Dept: LAB | Facility: HOSPITAL | Age: 62
End: 2017-11-15

## 2017-11-15 ENCOUNTER — TRANSCRIBE ORDERS (OUTPATIENT)
Dept: LAB | Facility: HOSPITAL | Age: 62
End: 2017-11-15

## 2017-11-15 ENCOUNTER — APPOINTMENT (OUTPATIENT)
Dept: OTHER | Facility: HOSPITAL | Age: 62
End: 2017-11-15
Attending: FAMILY MEDICINE

## 2017-11-15 DIAGNOSIS — Z12.31 ENCOUNTER FOR SCREENING MAMMOGRAM FOR MALIGNANT NEOPLASM OF BREAST: ICD-10-CM

## 2017-11-15 DIAGNOSIS — Z92.89 HX OF MAMMOGRAM: ICD-10-CM

## 2017-11-15 DIAGNOSIS — L70.9 ACNE, UNSPECIFIED ACNE TYPE: Primary | ICD-10-CM

## 2017-11-15 LAB
ALBUMIN SERPL-MCNC: 4.5 G/DL (ref 3.2–4.8)
ALBUMIN/GLOB SERPL: 1.7 G/DL (ref 1.5–2.5)
ALP SERPL-CCNC: 72 U/L (ref 25–100)
ALT SERPL W P-5'-P-CCNC: 13 U/L (ref 7–40)
ANION GAP SERPL CALCULATED.3IONS-SCNC: 12 MMOL/L (ref 3–11)
AST SERPL-CCNC: 23 U/L (ref 0–33)
BILIRUB CONJ SERPL-MCNC: 0.1 MG/DL (ref 0–0.2)
BILIRUB SERPL-MCNC: 0.6 MG/DL (ref 0.3–1.2)
BUN BLD-MCNC: 16 MG/DL (ref 9–23)
BUN/CREAT SERPL: 22.9 (ref 7–25)
CALCIUM SPEC-SCNC: 9.8 MG/DL (ref 8.7–10.4)
CHLORIDE SERPL-SCNC: 103 MMOL/L (ref 99–109)
CHOLEST SERPL-MCNC: 240 MG/DL (ref 0–200)
CO2 SERPL-SCNC: 24 MMOL/L (ref 20–31)
CREAT BLD-MCNC: 0.7 MG/DL (ref 0.6–1.3)
DEPRECATED RDW RBC AUTO: 44.1 FL (ref 37–54)
ERYTHROCYTE [DISTWIDTH] IN BLOOD BY AUTOMATED COUNT: 12.8 % (ref 11.3–14.5)
GFR SERPL CREATININE-BSD FRML MDRD: 85 ML/MIN/1.73
GLOBULIN UR ELPH-MCNC: 2.6 GM/DL
GLUCOSE BLD-MCNC: 82 MG/DL (ref 70–100)
HCT VFR BLD AUTO: 40.2 % (ref 34.5–44)
HGB BLD-MCNC: 13.3 G/DL (ref 11.5–15.5)
MCH RBC QN AUTO: 31.1 PG (ref 27–31)
MCHC RBC AUTO-ENTMCNC: 33.1 G/DL (ref 32–36)
MCV RBC AUTO: 93.9 FL (ref 80–99)
PLATELET # BLD AUTO: 262 10*3/MM3 (ref 150–450)
PMV BLD AUTO: 9.4 FL (ref 6–12)
POTASSIUM BLD-SCNC: 4.1 MMOL/L (ref 3.5–5.5)
PROT SERPL-MCNC: 7.1 G/DL (ref 5.7–8.2)
RBC # BLD AUTO: 4.28 10*6/MM3 (ref 3.89–5.14)
SODIUM BLD-SCNC: 139 MMOL/L (ref 132–146)
TRIGL SERPL-MCNC: 76 MG/DL (ref 0–150)
WBC NRBC COR # BLD: 3.74 10*3/MM3 (ref 3.5–10.8)

## 2017-11-15 PROCEDURE — 77067 SCR MAMMO BI INCL CAD: CPT | Performed by: RADIOLOGY

## 2017-11-15 PROCEDURE — G0202 SCR MAMMO BI INCL CAD: HCPCS

## 2017-11-15 PROCEDURE — 77063 BREAST TOMOSYNTHESIS BI: CPT

## 2017-11-15 PROCEDURE — 77063 BREAST TOMOSYNTHESIS BI: CPT | Performed by: RADIOLOGY

## 2017-11-15 PROCEDURE — 80053 COMPREHEN METABOLIC PANEL: CPT | Performed by: SPECIALIST

## 2017-11-15 PROCEDURE — 82465 ASSAY BLD/SERUM CHOLESTEROL: CPT | Performed by: SPECIALIST

## 2017-11-15 PROCEDURE — 84478 ASSAY OF TRIGLYCERIDES: CPT | Performed by: SPECIALIST

## 2017-11-15 PROCEDURE — 85027 COMPLETE CBC AUTOMATED: CPT | Performed by: SPECIALIST

## 2017-11-15 PROCEDURE — 82248 BILIRUBIN DIRECT: CPT | Performed by: SPECIALIST

## 2017-11-15 PROCEDURE — 36415 COLL VENOUS BLD VENIPUNCTURE: CPT | Performed by: SPECIALIST

## 2018-01-16 ENCOUNTER — APPOINTMENT (OUTPATIENT)
Dept: MAMMOGRAPHY | Facility: HOSPITAL | Age: 63
End: 2018-01-16

## 2018-01-30 ENCOUNTER — OFFICE VISIT (OUTPATIENT)
Dept: OBSTETRICS AND GYNECOLOGY | Facility: CLINIC | Age: 63
End: 2018-01-30

## 2018-01-30 VITALS
BODY MASS INDEX: 19.33 KG/M2 | DIASTOLIC BLOOD PRESSURE: 60 MMHG | SYSTOLIC BLOOD PRESSURE: 114 MMHG | WEIGHT: 116 LBS | HEIGHT: 65 IN

## 2018-01-30 DIAGNOSIS — Z12.31 ENCOUNTER FOR SCREENING MAMMOGRAM FOR BREAST CANCER: ICD-10-CM

## 2018-01-30 DIAGNOSIS — Z01.419 ENCOUNTER FOR GYNECOLOGICAL EXAMINATION WITHOUT ABNORMAL FINDING: Primary | ICD-10-CM

## 2018-01-30 PROCEDURE — 99396 PREV VISIT EST AGE 40-64: CPT | Performed by: OBSTETRICS & GYNECOLOGY

## 2018-01-30 NOTE — PROGRESS NOTES
Subjective  Chief Complaint   Patient presents with   • Gynecologic Exam     Patient is 62 y.o.  here for annual examination.  Pt doing well without complaints.  Pt had MMG done and has f/u appointment for additional views in [facility].  Pt with no bleeding.  Pt denies any menopausal symptoms.  Pt seeing Dr. Mcbride.  Pt not due for colonoscopy.  Pt on Osphena doing well.    History  Past Medical History:   Diagnosis Date   • Abnormal Pap smear of cervix    • Anxiety    • Depression    • Essential hypertension    • Fibroid, uterine    • H/O mammogram 2015   • Herpes 10/30/2017   • History of colon polyps    • History of endometrial biopsy 2012    POST MENOPAUSAL BLEEDING WITH UTERINE LEIOMOMA   • History of Papanicolaou smear of cervix 2015    NORMAL    • Post-menopausal atrophic vaginitis    • Recurrent urinary tract infection    • Varicella      Current Outpatient Prescriptions on File Prior to Visit   Medication Sig Dispense Refill   • ABSORICA 20 MG capsule Take one capsule every 2 days  0   • busPIRone (BUSPAR) 10 MG tablet Take 1 tablet by mouth 2 (Two) Times a Day. 60 tablet 5   • Calcium Carb-Cholecalciferol (CALCIUM 600 + D PO) Take  by mouth.     • cholecalciferol (VITAMIN D3) 1000 units tablet      • Multiple Vitamins-Minerals (CENTRUM SILVER ADULT 50+ PO) Take  by mouth.     • Multiple Vitamins-Minerals (OCUVITE ADULT 50+ PO) Take  by mouth.     • Omega-3 350 MG capsule delayed-release Take 350 mg by mouth daily.     • Ospemifene 60 MG tablet Take 1 tablet by mouth Daily. 30 tablet 11   • sertraline (ZOLOFT) 100 MG tablet Take 1 tablet by mouth Daily. 30 tablet 4   • traZODone (DESYREL) 100 MG tablet Take 1 tablet by mouth Every Night. 30 tablet 5   • valACYclovir (VALTREX) 500 MG tablet Take 1 tablet by mouth Daily. 90 tablet 3     No current facility-administered medications on file prior to visit.      No Known Allergies  Past Surgical History:   Procedure Laterality Date    • CHOLECYSTECTOMY     • DILATATION AND CURETTAGE     • EXPLORATORY LAPAROTOMY  2003   • LASIK  2008   • LASIK     • SMALL INTESTINE SURGERY  2003    SBO w/ resection & reanastamosis   • SMALL INTESTINE SURGERY  2011    [facility]   • TONSILLECTOMY     • TUBAL ABDOMINAL LIGATION     • WISDOM TOOTH EXTRACTION  1971     Family History   Problem Relation Age of Onset   • Arthritis Mother    • Hyperlipidemia Mother    • Dementia Mother    • Hypertension Mother    • Thyroid disease Mother    • Cancer Mother      skin   • Hypertension Father    • Alzheimer's disease Father    • Cancer Father      skin   • Melanoma Father    • Prostate cancer Father    • No Known Problems Sister    • No Known Problems Brother    • Cancer Maternal Uncle      lung   • Cancer Maternal Grandmother      colon   • Colon cancer Maternal Grandmother    • COPD Maternal Grandfather    • Cerebral aneurysm Paternal Grandmother    • Alcohol abuse Paternal Grandfather    • COPD Paternal Grandfather      Social History     Social History   • Marital status:      Spouse name: N/A   • Number of children: N/A   • Years of education: N/A     Social History Main Topics   • Smoking status: Former Smoker     Packs/day: 0.50     Years: 10.00     Quit date: 1/1/1986   • Smokeless tobacco: Never Used   • Alcohol use 7.2 oz/week     4 Glasses of wine, 10 Cans of beer, 2 Shots of liquor per week      Comment: Daily   • Drug use: No   • Sexual activity: Yes     Partners: Male     Birth control/ protection: Post-menopausal, Surgical     Other Topics Concern   • None     Social History Narrative     Review of Systems  The following systems were reviewed and negative:  constitution, eyes, ENT, respiratory, cardiovascular, gastrointestinal, genitourinary, integument, breast, hematologic / lymphatic, musculoskeletal, neurological, behavioral/psych, endocrine and allergies / immunologic     Objective  Vitals:    01/30/18 1410   BP: 114/60   Weight: 52.6 kg (116 lb)  "  Height: 165.1 cm (65\")     Physical Exam:  General Appearance: alert, appears stated age and cooperative  Head: normocephalic, without obvious abnormality and atraumatic  Eyes: lids and lashes normal, conjunctivae and sclerae normal, no icterus, no pallor, corneas clear and PERRLA  Ears: ears appear intact with no abnormalities noted  Nose: nares normal, septum midline, mucosa normal and no drainage  Neck: suppple, trachea midline and no thyromegaly  Lungs: clear to auscultation, respirations regular, respirations even and respirations unlabored  Heart: regular rhythm and normal rate, normal S1, S2, no murmur, gallop, or rubs and no click  Breasts: Examined in supine position  Symmetric without masses or skin dimpling  Nipples normal without inversion, lesions or discharge  There are no palpable axillary nodes  Abdomen: normal bowel sounds, no masses, no hepatomegaly, no splenomegaly, soft non-tender, no guarding and no rebound tenderness  Pelvic: Clinical staff was present for exam  External genitalia:  normal appearance of the external genitalia including Bartholin's and Lizton's glands.  :  urethral meatus normal;  Vaginal:  atrophic mucosal changes are present;  Cervix:  normal appearance.  Uterus:  normal size, shape and consistency.  Adnexa:  normal bimanual exam of the adnexa.  Extremities: moves extremities well, no edema, no cyanosis and no redness  Skin: no bleeding, bruising or rash and no lesions noted  Lymph Nodes: no palpable adenopathy  Psych: normal mood and affect, oriented to person, time and place, thought content organized and appropriate judgment    Lab Review   No data reviewed    Imaging   Mammogram report    Assessment/Plan    Problem List Items Addressed This Visit     None      Visit Diagnoses     Encounter for gynecological examination without abnormal finding    -  Primary  Pap was done today.  If she does not receive the results of the Pap within 2 weeks  time, she was instructed to " call to find out the results.  I explained to Brea that the recommendations for Pap smear interval in a low risk patient has lengthened to 3 years time if cytology alone normal or  5 years time if both cytology and HPV testing were normal.  I encouraged her to be seen yearly for a full physical exam including breast and pelvic exam even during the off years when PAP's will not be performed.    Relevant Orders    Pap IG, Rfx HPV ASCU - ThinPrep Vial, Cervix    Encounter for screening mammogram for breast cancer    It is recommended per ACOG, for women at average risk to start annual mammogram screening at the age of 40 until the age of 75 and an individualized decision be made for women after age 75.  She was encouraged to continue getting yearly mammograms.  She should report any palpable breast lump(s) or skin changes regardless of mammographic findings.  I explained to Brea that notification regarding her mammogram results will come from the center performing the study.  Our office will not be routinely calling with mammogram results.  It is her responsibility to make sure that the results from the mammogram are communicated to her by the breast center.  If she has any questions about the results, she is welcome to call our office anytime.  Pt has additional views scheduled.              Follow up 1 year or prn     This note was electronically signed.  Odalys Agudelo M.D.

## 2018-02-07 DIAGNOSIS — Z01.419 ENCOUNTER FOR GYNECOLOGICAL EXAMINATION WITHOUT ABNORMAL FINDING: ICD-10-CM

## 2018-02-27 ENCOUNTER — HOSPITAL ENCOUNTER (OUTPATIENT)
Dept: MAMMOGRAPHY | Facility: HOSPITAL | Age: 63
Discharge: HOME | End: 2018-02-27
Admitting: FAMILY MEDICINE

## 2018-02-27 DIAGNOSIS — R92.8 ABNORMAL MAMMOGRAM: ICD-10-CM

## 2018-02-27 PROCEDURE — 77066 DX MAMMO INCL CAD BI: CPT

## 2018-02-27 PROCEDURE — 77066 DX MAMMO INCL CAD BI: CPT | Performed by: RADIOLOGY

## 2018-02-27 PROCEDURE — G0279 TOMOSYNTHESIS, MAMMO: HCPCS

## 2018-02-27 PROCEDURE — 77062 BREAST TOMOSYNTHESIS BI: CPT | Performed by: RADIOLOGY

## 2018-02-28 ENCOUNTER — OFFICE VISIT (OUTPATIENT)
Dept: INTERNAL MEDICINE | Facility: CLINIC | Age: 63
End: 2018-02-28

## 2018-02-28 VITALS
HEART RATE: 64 BPM | BODY MASS INDEX: 18.83 KG/M2 | HEIGHT: 65 IN | OXYGEN SATURATION: 98 % | SYSTOLIC BLOOD PRESSURE: 110 MMHG | RESPIRATION RATE: 12 BRPM | DIASTOLIC BLOOD PRESSURE: 70 MMHG | WEIGHT: 113 LBS

## 2018-02-28 DIAGNOSIS — F33.1 MODERATE EPISODE OF RECURRENT MAJOR DEPRESSIVE DISORDER (HCC): Primary | ICD-10-CM

## 2018-02-28 PROCEDURE — 99213 OFFICE O/P EST LOW 20 MIN: CPT | Performed by: FAMILY MEDICINE

## 2018-02-28 RX ORDER — ESCITALOPRAM OXALATE 10 MG/1
10 TABLET ORAL DAILY
Qty: 30 TABLET | Refills: 2 | Status: SHIPPED | OUTPATIENT
Start: 2018-02-28 | End: 2018-06-13 | Stop reason: SDUPTHER

## 2018-05-16 ENCOUNTER — LAB (OUTPATIENT)
Dept: LAB | Facility: HOSPITAL | Age: 63
End: 2018-05-16

## 2018-05-16 ENCOUNTER — TRANSCRIBE ORDERS (OUTPATIENT)
Dept: LAB | Facility: HOSPITAL | Age: 63
End: 2018-05-16

## 2018-05-16 DIAGNOSIS — L73.2 HIDRADENITIS SUPPURATIVA: ICD-10-CM

## 2018-05-16 DIAGNOSIS — L73.2 HIDRADENITIS SUPPURATIVA: Primary | ICD-10-CM

## 2018-05-16 LAB
ALBUMIN SERPL-MCNC: 4.8 G/DL (ref 3.5–5)
ALBUMIN/GLOB SERPL: 1.6 G/DL (ref 1–2)
ALP SERPL-CCNC: 70 U/L (ref 38–126)
ALT SERPL W P-5'-P-CCNC: 24 U/L (ref 13–69)
ANION GAP SERPL CALCULATED.3IONS-SCNC: 15.4 MMOL/L (ref 10–20)
AST SERPL-CCNC: 33 U/L (ref 15–46)
BASOPHILS # BLD AUTO: 0.03 10*3/MM3 (ref 0–0.2)
BASOPHILS NFR BLD AUTO: 0.6 % (ref 0–2.5)
BILIRUB CONJ SERPL-MCNC: 0.2 MG/DL (ref 0–0.4)
BILIRUB SERPL-MCNC: 0.8 MG/DL (ref 0.2–1.3)
BUN BLD-MCNC: 11 MG/DL (ref 7–20)
BUN/CREAT SERPL: 15.7 (ref 7.1–23.5)
CALCIUM SPEC-SCNC: 9.3 MG/DL (ref 8.4–10.2)
CHLORIDE SERPL-SCNC: 100 MMOL/L (ref 98–107)
CHOLEST SERPL-MCNC: 220 MG/DL (ref 0–199)
CO2 SERPL-SCNC: 30 MMOL/L (ref 26–30)
CREAT BLD-MCNC: 0.7 MG/DL (ref 0.6–1.3)
DEPRECATED RDW RBC AUTO: 41.4 FL (ref 37–54)
EOSINOPHIL # BLD AUTO: 0.14 10*3/MM3 (ref 0–0.7)
EOSINOPHIL NFR BLD AUTO: 2.7 % (ref 0–7)
ERYTHROCYTE [DISTWIDTH] IN BLOOD BY AUTOMATED COUNT: 12 % (ref 11.5–14.5)
GFR SERPL CREATININE-BSD FRML MDRD: 85 ML/MIN/1.73
GLOBULIN UR ELPH-MCNC: 3 GM/DL
GLUCOSE BLD-MCNC: 76 MG/DL (ref 74–98)
HCT VFR BLD AUTO: 38.5 % (ref 37–47)
HGB BLD-MCNC: 13.3 G/DL (ref 12–16)
IMM GRANULOCYTES # BLD: 0.01 10*3/MM3 (ref 0–0.06)
IMM GRANULOCYTES NFR BLD: 0.2 % (ref 0–0.6)
LYMPHOCYTES # BLD AUTO: 1.75 10*3/MM3 (ref 0.6–3.4)
LYMPHOCYTES NFR BLD AUTO: 33.1 % (ref 10–50)
MCH RBC QN AUTO: 32.7 PG (ref 27–31)
MCHC RBC AUTO-ENTMCNC: 34.5 G/DL (ref 30–37)
MCV RBC AUTO: 94.6 FL (ref 81–99)
MONOCYTES # BLD AUTO: 0.38 10*3/MM3 (ref 0–0.9)
MONOCYTES NFR BLD AUTO: 7.2 % (ref 0–12)
NEUTROPHILS # BLD AUTO: 2.97 10*3/MM3 (ref 2–6.9)
NEUTROPHILS NFR BLD AUTO: 56.2 % (ref 37–80)
NRBC BLD MANUAL-RTO: 0 /100 WBC (ref 0–0)
PLATELET # BLD AUTO: 251 10*3/MM3 (ref 130–400)
PMV BLD AUTO: 9 FL (ref 6–12)
POTASSIUM BLD-SCNC: 4.4 MMOL/L (ref 3.5–5.1)
PROT SERPL-MCNC: 7.8 G/DL (ref 6.3–8.2)
RBC # BLD AUTO: 4.07 10*6/MM3 (ref 4.2–5.4)
SODIUM BLD-SCNC: 141 MMOL/L (ref 137–145)
TRIGL SERPL-MCNC: 60 MG/DL
WBC NRBC COR # BLD: 5.28 10*3/MM3 (ref 4.8–10.8)

## 2018-05-16 PROCEDURE — 80053 COMPREHEN METABOLIC PANEL: CPT

## 2018-05-16 PROCEDURE — 82248 BILIRUBIN DIRECT: CPT

## 2018-05-16 PROCEDURE — 84478 ASSAY OF TRIGLYCERIDES: CPT

## 2018-05-16 PROCEDURE — 82465 ASSAY BLD/SERUM CHOLESTEROL: CPT

## 2018-05-16 PROCEDURE — 36415 COLL VENOUS BLD VENIPUNCTURE: CPT

## 2018-05-16 PROCEDURE — 85025 COMPLETE CBC W/AUTO DIFF WBC: CPT

## 2018-06-13 DIAGNOSIS — F33.1 MODERATE EPISODE OF RECURRENT MAJOR DEPRESSIVE DISORDER (HCC): ICD-10-CM

## 2018-06-13 RX ORDER — ESCITALOPRAM OXALATE 10 MG/1
TABLET ORAL
Qty: 30 TABLET | Refills: 2 | Status: SHIPPED | OUTPATIENT
Start: 2018-06-13 | End: 2018-06-28 | Stop reason: SDUPTHER

## 2018-06-28 ENCOUNTER — OFFICE VISIT (OUTPATIENT)
Dept: INTERNAL MEDICINE | Facility: CLINIC | Age: 63
End: 2018-06-28

## 2018-06-28 VITALS
HEIGHT: 65 IN | SYSTOLIC BLOOD PRESSURE: 100 MMHG | TEMPERATURE: 97.5 F | DIASTOLIC BLOOD PRESSURE: 80 MMHG | HEART RATE: 63 BPM | BODY MASS INDEX: 19.37 KG/M2 | OXYGEN SATURATION: 100 % | WEIGHT: 116.25 LBS

## 2018-06-28 DIAGNOSIS — H00.14 CHALAZION OF LEFT UPPER EYELID: ICD-10-CM

## 2018-06-28 DIAGNOSIS — Z23 NEED FOR SHINGLES VACCINE: Primary | ICD-10-CM

## 2018-06-28 DIAGNOSIS — A60.04 HERPES SIMPLEX VULVOVAGINITIS: ICD-10-CM

## 2018-06-28 DIAGNOSIS — F33.1 MODERATE EPISODE OF RECURRENT MAJOR DEPRESSIVE DISORDER (HCC): ICD-10-CM

## 2018-06-28 DIAGNOSIS — F41.9 ANXIETY: ICD-10-CM

## 2018-06-28 DIAGNOSIS — N94.10 DYSPAREUNIA, FEMALE: ICD-10-CM

## 2018-06-28 DIAGNOSIS — H02.402 PTOSIS OF LEFT EYELID: ICD-10-CM

## 2018-06-28 DIAGNOSIS — F51.04 PSYCHOPHYSIOLOGICAL INSOMNIA: ICD-10-CM

## 2018-06-28 PROCEDURE — 99396 PREV VISIT EST AGE 40-64: CPT | Performed by: FAMILY MEDICINE

## 2018-06-28 RX ORDER — TRAZODONE HYDROCHLORIDE 100 MG/1
100 TABLET ORAL NIGHTLY
Qty: 90 TABLET | Refills: 1 | Status: SHIPPED | OUTPATIENT
Start: 2018-06-28 | End: 2019-01-15 | Stop reason: SDUPTHER

## 2018-06-28 RX ORDER — VALACYCLOVIR HYDROCHLORIDE 500 MG/1
500 TABLET, FILM COATED ORAL DAILY
Qty: 90 TABLET | Refills: 3 | Status: SHIPPED | OUTPATIENT
Start: 2018-06-28 | End: 2018-11-05 | Stop reason: SDUPTHER

## 2018-06-28 RX ORDER — BUSPIRONE HYDROCHLORIDE 10 MG/1
10 TABLET ORAL 2 TIMES DAILY
Qty: 180 TABLET | Refills: 1 | Status: SHIPPED | OUTPATIENT
Start: 2018-06-28 | End: 2018-08-13 | Stop reason: SDUPTHER

## 2018-06-28 RX ORDER — ESCITALOPRAM OXALATE 10 MG/1
15 TABLET ORAL DAILY
Qty: 135 TABLET | Refills: 1 | Status: SHIPPED | OUTPATIENT
Start: 2018-06-28 | End: 2018-08-07 | Stop reason: SDUPTHER

## 2018-06-28 RX ORDER — ERYTHROMYCIN 5 MG/G
OINTMENT OPHTHALMIC NIGHTLY
Qty: 3.5 G | Refills: 0 | Status: SHIPPED | OUTPATIENT
Start: 2018-06-28 | End: 2019-08-12

## 2018-06-28 RX ORDER — TRIAMCINOLONE ACETONIDE OINTMENT 0.5 MG/G
OINTMENT TOPICAL
Refills: 0 | COMMUNITY
Start: 2018-05-21 | End: 2019-08-12

## 2018-07-31 ENCOUNTER — PATIENT MESSAGE (OUTPATIENT)
Dept: INTERNAL MEDICINE | Facility: CLINIC | Age: 63
End: 2018-07-31

## 2018-07-31 DIAGNOSIS — F33.1 MODERATE EPISODE OF RECURRENT MAJOR DEPRESSIVE DISORDER (HCC): ICD-10-CM

## 2018-08-07 RX ORDER — ESCITALOPRAM OXALATE 20 MG/1
20 TABLET ORAL DAILY
Start: 2018-08-07 | End: 2018-08-13 | Stop reason: SDUPTHER

## 2018-08-13 ENCOUNTER — OFFICE VISIT (OUTPATIENT)
Dept: INTERNAL MEDICINE | Facility: CLINIC | Age: 63
End: 2018-08-13

## 2018-08-13 VITALS
TEMPERATURE: 98.8 F | BODY MASS INDEX: 19.33 KG/M2 | HEIGHT: 65 IN | SYSTOLIC BLOOD PRESSURE: 108 MMHG | DIASTOLIC BLOOD PRESSURE: 76 MMHG | OXYGEN SATURATION: 98 % | WEIGHT: 116 LBS | HEART RATE: 74 BPM

## 2018-08-13 DIAGNOSIS — F33.1 MODERATE EPISODE OF RECURRENT MAJOR DEPRESSIVE DISORDER (HCC): Primary | ICD-10-CM

## 2018-08-13 DIAGNOSIS — F51.04 PSYCHOPHYSIOLOGICAL INSOMNIA: ICD-10-CM

## 2018-08-13 DIAGNOSIS — F41.9 ANXIETY: ICD-10-CM

## 2018-08-13 PROCEDURE — 99213 OFFICE O/P EST LOW 20 MIN: CPT | Performed by: PHYSICIAN ASSISTANT

## 2018-08-13 RX ORDER — ESCITALOPRAM OXALATE 10 MG/1
10 TABLET ORAL DAILY
Start: 2018-08-13 | End: 2018-10-12 | Stop reason: ALTCHOICE

## 2018-08-13 RX ORDER — BUPROPION HYDROCHLORIDE 150 MG/1
150 TABLET ORAL DAILY
Qty: 30 TABLET | Refills: 5 | Status: SHIPPED | OUTPATIENT
Start: 2018-08-13 | End: 2018-09-13 | Stop reason: ALTCHOICE

## 2018-08-13 RX ORDER — BUSPIRONE HYDROCHLORIDE 10 MG/1
10 TABLET ORAL 2 TIMES DAILY
Qty: 60 TABLET | Refills: 5 | Status: SHIPPED | OUTPATIENT
Start: 2018-08-13

## 2018-08-13 NOTE — PROGRESS NOTES
"Brea Perez is a 62 y.o. female.     Subjective   History of Present Illness   Here today for follow up of depression, anxiety and insomnia.  Has tried and failed Zoloft, Cymbalta and most recently Lexapro due to lack of efficacy. She reports anhedonia and lack of motivation to complete tasks which has been problematic since around 2004.  Zoloft initially worked for awhile but did not work when it was retried a few years later. No medications have made her symptoms worse.  Trazodone helps her rest at night and Buspar prn helps some with anxiety. She denies SI or HI.         The following portions of the patient's history were reviewed and updated as appropriate: allergies, current medications, past family history, past medical history, past social history, past surgical history and problem list.    Review of Systems    Constitutional: Negative for appetite change, chills, fatigue, fever and unexpected weight change.   Respiratory: Negative for cough, chest tightness, shortness of breath and wheezing.    Cardiovascular: Negative for chest pain, palpitations and leg swelling.   Neurological: Negative for dizziness, tremors, seizures, weakness, numbness and headaches.   Psychiatric/Behavioral: sleep disturbances, dysphoric mood, anxious.  Negative for agitation, behavioral problems, confusion, hallucinations, self-injury and suicidal ideas.     Objective    Physical Exam  Constitutional: Appears well-developed and well-nourished.   Cardiovascular: Normal rate, regular rhythm and normal heart sounds.    Pulmonary/Chest: Effort normal and breath sounds normal. No respiratory distress.  Has no wheezes or rales. Exhibits no chest wall tenderness.   Neurological: Alert and oriented to person, place, and time.   Psychiatric: Has a normal mood and affect. Behavior is normal. Judgment and thought content normal.       /76   Pulse 74   Temp 98.8 °F (37.1 °C)   Ht 165.1 cm (65\")   Wt 52.6 kg (116 lb)   SpO2 98% "   BMI 19.30 kg/m²     Nursing note and vitals reviewed.        Assessment/Plan   Brea was seen today for wants genetic testing.    Diagnoses and all orders for this visit:    Moderate episode of recurrent major depressive disorder (CMS/HCC)  -     Taper down to discontinue: escitalopram (LEXAPRO) 10 MG tablet; Take 1 tablet by mouth Daily.    Psychophysiological insomnia        -     Continue Trazodone prn.     Anxiety  -     Continue prn: busPIRone (BUSPAR) 10 MG tablet; Take 1 tablet by mouth 2 (Two) Times a Day.    Other orders  -     Begin: buPROPion XL (WELLBUTRIN XL) 150 MG 24 hr tablet; Take 1 tablet by mouth Daily.        Return in 1 month for follow up or sooner if needed.

## 2018-09-13 ENCOUNTER — OFFICE VISIT (OUTPATIENT)
Dept: INTERNAL MEDICINE | Facility: CLINIC | Age: 63
End: 2018-09-13

## 2018-09-13 VITALS
WEIGHT: 110 LBS | TEMPERATURE: 98.8 F | DIASTOLIC BLOOD PRESSURE: 78 MMHG | SYSTOLIC BLOOD PRESSURE: 116 MMHG | OXYGEN SATURATION: 100 % | HEIGHT: 65 IN | BODY MASS INDEX: 18.33 KG/M2 | HEART RATE: 75 BPM

## 2018-09-13 DIAGNOSIS — F41.9 ANXIETY: ICD-10-CM

## 2018-09-13 DIAGNOSIS — F33.1 MODERATE EPISODE OF RECURRENT MAJOR DEPRESSIVE DISORDER (HCC): Primary | ICD-10-CM

## 2018-09-13 DIAGNOSIS — F51.04 PSYCHOPHYSIOLOGICAL INSOMNIA: ICD-10-CM

## 2018-09-13 PROCEDURE — 99213 OFFICE O/P EST LOW 20 MIN: CPT | Performed by: PHYSICIAN ASSISTANT

## 2018-09-13 RX ORDER — FLUOXETINE 10 MG/1
10 CAPSULE ORAL DAILY
Qty: 30 CAPSULE | Refills: 1 | Status: SHIPPED | OUTPATIENT
Start: 2018-09-13 | End: 2018-10-12 | Stop reason: SDUPTHER

## 2018-09-13 NOTE — PROGRESS NOTES
Brea Perez is a 62 y.o. female.     Subjective   History of Present Illness   Here today for 1 month follow up of depression and anxiety. Last month she was tapered off of Lexapro and Wellbutrin was initiated. Trazodone was continued at bedtime for insomnia and has continued to be stable.  She reports that she has not done well with Wellbutrin due to worsened depression, continued lack of energy and motivation as well as increased agitation.          The following portions of the patient's history were reviewed and updated as appropriate: allergies, current medications, past family history, past medical history, past social history, past surgical history and problem list.    Review of Systems    Constitutional: Negative for appetite change, chills, fatigue, fever and unexpected weight change.   HENT: Negative for congestion, ear pain, hearing loss, nosebleeds, postnasal drip, rhinorrhea, sore throat, tinnitus and trouble swallowing.    Eyes: Negative for photophobia, discharge and visual disturbance.   Respiratory: Negative for cough, chest tightness, shortness of breath and wheezing.    Cardiovascular: Negative for chest pain, palpitations and leg swelling.   Gastrointestinal: Negative for abdominal distention, abdominal pain, blood in stool, constipation, diarrhea, nausea and vomiting.   Endocrine: Negative for cold intolerance, heat intolerance, polydipsia, polyphagia and polyuria.   Musculoskeletal: Negative for arthralgias, back pain, joint swelling, myalgias, neck pain and neck stiffness.   Skin: Negative for color change, pallor, rash and wound.   Allergic/Immunologic: Negative for environmental allergies, food allergies and immunocompromised state.   Neurological: Negative for dizziness, tremors, seizures, weakness, numbness and headaches.   Hematological: Negative for adenopathy. Does not bruise/bleed easily.   Psychiatric/Behavioral: anxious, dysphoric mood, sleep disturbances, agitation.  Negative  "for behavioral problems, confusion, hallucinations, self-injury and suicidal ideas.     Objective    Physical Exam  Constitutional:  Appears well-developed and well-nourished.   Cardiovascular: Normal rate, regular rhythm and normal heart sounds.    Pulmonary/Chest: Effort normal and breath sounds normal. No respiratory distress.  Has no wheezes or rales. Exhibits no chest wall tenderness.   Abdominal: Soft. Bowel sounds are normal. Exhibits no distension and no mass. There is no tenderness.   Musculoskeletal: Normal range of motion. Exhibits no tenderness.   Neurological: Alert and oriented to person, place, and time.   Skin: Skin is warm and dry.   Psychiatric: Has a dysphoric mood and emotional affect. Behavior is normal. Judgment and thought content normal.       /78   Pulse 75   Temp 98.8 °F (37.1 °C)   Ht 165.1 cm (65\")   Wt 49.9 kg (110 lb)   SpO2 100%   BMI 18.30 kg/m²     Nursing note and vitals reviewed.        Assessment/Plan   Brea was seen today for follow-up and depression.    Diagnoses and all orders for this visit:    Moderate episode of recurrent major depressive disorder (CMS/HCC)  Anxiety  -     FLUoxetine (PROZAC) 10 MG capsule; Take 1 capsule by mouth Daily.  Discontinue Wellbutrin and begin Prozac daily. She was informed that if she is tolerating it well she can request a dose increase after 2-3 weeks and then follow up 4 weeks later.       Psychophysiological insomnia  Continue Trazodone nightly.         F/u 1 month or sooner if needed.        "

## 2018-10-12 ENCOUNTER — OFFICE VISIT (OUTPATIENT)
Dept: INTERNAL MEDICINE | Facility: CLINIC | Age: 63
End: 2018-10-12

## 2018-10-12 VITALS
BODY MASS INDEX: 18.49 KG/M2 | OXYGEN SATURATION: 99 % | HEART RATE: 62 BPM | WEIGHT: 111 LBS | HEIGHT: 65 IN | TEMPERATURE: 98.3 F | SYSTOLIC BLOOD PRESSURE: 106 MMHG | DIASTOLIC BLOOD PRESSURE: 70 MMHG

## 2018-10-12 DIAGNOSIS — F41.9 ANXIETY: ICD-10-CM

## 2018-10-12 DIAGNOSIS — Z23 NEED FOR INFLUENZA VACCINATION: ICD-10-CM

## 2018-10-12 DIAGNOSIS — F33.1 MODERATE EPISODE OF RECURRENT MAJOR DEPRESSIVE DISORDER (HCC): Primary | ICD-10-CM

## 2018-10-12 PROCEDURE — 90674 CCIIV4 VAC NO PRSV 0.5 ML IM: CPT | Performed by: PHYSICIAN ASSISTANT

## 2018-10-12 PROCEDURE — 90471 IMMUNIZATION ADMIN: CPT | Performed by: PHYSICIAN ASSISTANT

## 2018-10-12 PROCEDURE — 99213 OFFICE O/P EST LOW 20 MIN: CPT | Performed by: PHYSICIAN ASSISTANT

## 2018-10-12 RX ORDER — FLUOXETINE HYDROCHLORIDE 20 MG/1
20 CAPSULE ORAL DAILY
Qty: 30 CAPSULE | Refills: 5 | Status: SHIPPED | OUTPATIENT
Start: 2018-10-12 | End: 2019-02-05

## 2018-10-12 NOTE — PROGRESS NOTES
Brea Perez is a 62 y.o. female.     Subjective   History of Present Illness   Here today for follow up of depression and anxiety. 1 month ago she was changed from Wellbutrin to Prozac due to worsened depression after Wellbutrin was initiated. She reports that she is feeling much better than she did on Wellbutrin but no better than she did previously while taking Lexapro. Dysphoric mood, anhedonia and anxiety are still bothersome.  She denies any side effects of Prozac. She denies SI, HI or sleep disturbances.         The following portions of the patient's history were reviewed and updated as appropriate: allergies, current medications, past family history, past medical history, past social history, past surgical history and problem list.    Review of Systems    Constitutional: Negative for appetite change, chills, fatigue, fever and unexpected weight change.   Respiratory: Negative for cough, chest tightness, shortness of breath and wheezing.    Cardiovascular: Negative for chest pain, palpitations and leg swelling.   Psychiatric/Behavioral: dysphoric mood, anxious, anhedonia.  Negative for sleep disturbances, agitation, behavioral problems, confusion, hallucinations, self-injury and suicidal ideas.     Objective    Physical Exam  Constitutional:  Appears well-developed and well-nourished.   Cardiovascular: Normal rate, regular rhythm and normal heart sounds.    Pulmonary/Chest: Effort normal and breath sounds normal. No respiratory distress.  Has no wheezes or rales. Exhibits no chest wall tenderness.   Abdominal: Soft. Bowel sounds are normal. Exhibits no distension and no mass. There is no tenderness.   Musculoskeletal: Normal range of motion. Exhibits no tenderness.   Neurological: Alert and oriented to person, place, and time.   Skin: Skin is warm and dry.   Psychiatric: Has a normal mood and affect. Behavior is normal. Judgment and thought content normal.       /70   Pulse 62   Temp 98.3 °F  "(36.8 °C)   Ht 165.1 cm (65\")   Wt 50.3 kg (111 lb)   SpO2 99%   BMI 18.47 kg/m²     Nursing note and vitals reviewed.        Assessment/Plan   Brea was seen today for follow-up and depression.    Diagnoses and all orders for this visit:    Moderate episode of recurrent major depressive disorder (CMS/HCC)  Anxiety  -     Dose increase: FLUoxetine (PROzac) 20 MG capsule; Take 1 capsule by mouth Daily.      Need for influenza vaccination  -     Flucelvax Quad (Vial) =>4 yrs ([id number])      F/u 1 month or sooner if needed.         "

## 2018-11-05 DIAGNOSIS — A60.04 HERPES SIMPLEX VULVOVAGINITIS: ICD-10-CM

## 2018-11-05 RX ORDER — VALACYCLOVIR HYDROCHLORIDE 500 MG/1
500 TABLET, FILM COATED ORAL DAILY
Qty: 90 TABLET | Refills: 3 | Status: SHIPPED | OUTPATIENT
Start: 2018-11-05 | End: 2021-02-10

## 2018-11-12 ENCOUNTER — OFFICE VISIT (OUTPATIENT)
Dept: INTERNAL MEDICINE | Facility: CLINIC | Age: 63
End: 2018-11-12

## 2018-11-12 VITALS
BODY MASS INDEX: 18.83 KG/M2 | SYSTOLIC BLOOD PRESSURE: 108 MMHG | HEART RATE: 67 BPM | TEMPERATURE: 98.4 F | WEIGHT: 113 LBS | DIASTOLIC BLOOD PRESSURE: 64 MMHG | OXYGEN SATURATION: 98 % | HEIGHT: 65 IN

## 2018-11-12 DIAGNOSIS — F33.1 MODERATE EPISODE OF RECURRENT MAJOR DEPRESSIVE DISORDER (HCC): Primary | ICD-10-CM

## 2018-11-12 DIAGNOSIS — F41.9 ANXIETY: ICD-10-CM

## 2018-11-12 PROCEDURE — 99213 OFFICE O/P EST LOW 20 MIN: CPT | Performed by: PHYSICIAN ASSISTANT

## 2018-11-12 RX ORDER — ZOSTER VACCINE RECOMBINANT, ADJUVANTED 50 MCG/0.5
KIT INTRAMUSCULAR
Refills: 0 | COMMUNITY
Start: 2018-10-17 | End: 2018-11-12

## 2018-11-12 NOTE — PROGRESS NOTES
"Brea Perez is a 63 y.o. female.     Subjective   History of Present Illness   Here today for follow up of depression and anxiety. One month ago Prozac dose was increased from 10 to 20 mg daily which she feels was helpful but there is still some room for improvement. She admits she has been more interested in doing things and a little happier overall. She denies SI or HI. She is sleeping well.           The following portions of the patient's history were reviewed and updated as appropriate: allergies, current medications, past family history, past medical history, past social history, past surgical history and problem list.    Review of Systems    Constitutional: Negative for appetite change, chills, fatigue, fever and unexpected weight change.   Respiratory: Negative for cough, chest tightness, shortness of breath and wheezing.    Cardiovascular: Negative for chest pain, palpitations and leg swelling.   Psychiatric/Behavioral: dysphoric mood and anxious feeling- improved. Negative for sleep disturbances, agitation, behavioral problems, confusion, hallucinations, self-injury and suicidal ideas.     Objective    Physical Exam  Constitutional: Appears well-developed and well-nourished.   Cardiovascular: Normal rate, regular rhythm and normal heart sounds.    Pulmonary/Chest: Effort normal and breath sounds normal. No respiratory distress.  Has no wheezes or rales. Exhibits no chest wall tenderness.   Psychiatric: Has a normal mood and affect. Behavior is normal. Judgment and thought content normal.       /64   Pulse 67   Temp 98.4 °F (36.9 °C)   Ht 165.1 cm (65\")   Wt 51.3 kg (113 lb)   SpO2 98%   BMI 18.80 kg/m²     Nursing note and vitals reviewed.          Assessment/Plan   Brea was seen today for follow-up.    Diagnoses and all orders for this visit:    Moderate episode of recurrent major depressive disorder (CMS/HCC)    Anxiety    Much better controlled.  Continue Prozac 20 mg daily.     She " will send a ubitust message in 1 month if she feels she needs a dose increase. Otherwise, f/u in 6 months.

## 2018-12-07 RX ORDER — FLUOXETINE 10 MG/1
30 CAPSULE ORAL DAILY
Qty: 30 CAPSULE | Refills: 5 | Status: SHIPPED | OUTPATIENT
Start: 2018-12-07 | End: 2019-02-10 | Stop reason: SDUPTHER

## 2019-01-15 DIAGNOSIS — F51.04 PSYCHOPHYSIOLOGICAL INSOMNIA: ICD-10-CM

## 2019-01-15 RX ORDER — TRAZODONE HYDROCHLORIDE 100 MG/1
100 TABLET ORAL NIGHTLY
Qty: 90 TABLET | Refills: 3 | Status: SHIPPED | OUTPATIENT
Start: 2019-01-15 | End: 2021-02-10

## 2019-02-05 ENCOUNTER — OFFICE VISIT (OUTPATIENT)
Dept: OBSTETRICS AND GYNECOLOGY | Facility: CLINIC | Age: 64
End: 2019-02-05

## 2019-02-05 VITALS
HEIGHT: 65 IN | SYSTOLIC BLOOD PRESSURE: 116 MMHG | BODY MASS INDEX: 17.66 KG/M2 | DIASTOLIC BLOOD PRESSURE: 60 MMHG | WEIGHT: 106 LBS

## 2019-02-05 DIAGNOSIS — Z12.39 ENCOUNTER FOR BREAST CANCER SCREENING OTHER THAN MAMMOGRAM: ICD-10-CM

## 2019-02-05 DIAGNOSIS — Z01.419 ENCOUNTER FOR GYNECOLOGICAL EXAMINATION WITHOUT ABNORMAL FINDING: Primary | ICD-10-CM

## 2019-02-05 PROCEDURE — 99396 PREV VISIT EST AGE 40-64: CPT | Performed by: OBSTETRICS & GYNECOLOGY

## 2019-02-05 RX ORDER — HEPATITIS A VACCINE, INACTIVATED 50 [IU]/ML
INJECTION, SUSPENSION INTRAMUSCULAR
Refills: 0 | COMMUNITY
Start: 2018-11-12 | End: 2019-08-12

## 2019-02-05 NOTE — PROGRESS NOTES
Subjective  Chief Complaint   Patient presents with   • Gynecologic Exam     Patient is 63 y.o.  here for annual examination.  Pt doing well without complaints.  Pt had been seeing Dr. Mcbride; now see PA at [facility].  Pt due for MMG.  Pt due for colonoscopy 2019 with Dr. Martínez.  Pt using Osphena 1-2x/week and reports doing well.    History  Past Medical History:   Diagnosis Date   • Abnormal Pap smear of cervix    • Anxiety    • Chronic constipation    • Depression    • Essential hypertension    • Fibroid, uterine    • H/O mammogram 2015   • Herpes 10/30/2017   • History of colon polyps    • History of endometrial biopsy 2012    POST MENOPAUSAL BLEEDING WITH UTERINE LEIOMOMA   • History of Papanicolaou smear of cervix 2015    NORMAL    • Post-menopausal atrophic vaginitis    • Recurrent urinary tract infection    • Varicella      Current Outpatient Medications on File Prior to Visit   Medication Sig Dispense Refill   • busPIRone (BUSPAR) 10 MG tablet Take 1 tablet by mouth 2 (Two) Times a Day. 60 tablet 5   • cholecalciferol (VITAMIN D3) 1000 units tablet      • erythromycin (ROMYCIN) 5 MG/GM ophthalmic ointment Administer  into the left eye Every Night. 3.5 g 0   • FLUoxetine (PROZAC) 10 MG capsule Take 3 capsules by mouth Daily. 30 capsule 5   • Multiple Vitamins-Minerals (CENTRUM SILVER ADULT 50+ PO) Take  by mouth.     • Multiple Vitamins-Minerals (OCUVITE ADULT 50+ PO) Take  by mouth.     • Omega-3 350 MG capsule delayed-release Take 350 mg by mouth daily.     • Ospemifene 60 MG tablet Take 1 tablet by mouth Daily. 30 tablet 11   • traZODone (DESYREL) 100 MG tablet Take 1 tablet by mouth Every Night. 90 tablet 3   • TRIANEX 0.05 % ointment   0   • valACYclovir (VALTREX) 500 MG tablet Take 1 tablet by mouth Daily. 90 tablet 3   • VAQTA 50 UNIT/ML injection inject 1 milliliter intramuscularly  0   • [DISCONTINUED] ABSORICA 20 MG capsule Take one capsule every 2 days  0    • [DISCONTINUED] Calcium Carb-Cholecalciferol (CALCIUM 600 + D PO) Take  by mouth.     • [DISCONTINUED] FLUoxetine (PROzac) 20 MG capsule Take 1 capsule by mouth Daily. 30 capsule 5     No current facility-administered medications on file prior to visit.      No Known Allergies  Past Surgical History:   Procedure Laterality Date   • CHOLECYSTECTOMY     • DILATATION AND CURETTAGE     • EXPLORATORY LAPAROTOMY     • LAPAROSCOPIC CHOLECYSTECTOMY     • LASIK     • LASIK     • SMALL INTESTINE SURGERY      SBO w/ resection & reanastamosis   • SMALL INTESTINE SURGERY      [facility]   • TONSILLECTOMY     • TUBAL ABDOMINAL LIGATION     • WISDOM TOOTH EXTRACTION       Family History   Problem Relation Age of Onset   • Arthritis Mother    • Hyperlipidemia Mother    • Dementia Mother    • Hypertension Mother    • Thyroid disease Mother    • Cancer Mother         skin   • Hypertension Father    • Alzheimer's disease Father    • Cancer Father         skin   • Melanoma Father    • Prostate cancer Father    • No Known Problems Sister    • No Known Problems Brother    • Cancer Maternal Uncle         lung   • Cancer Maternal Grandmother         colon   • Colon cancer Maternal Grandmother    • COPD Maternal Grandfather    • Cerebral aneurysm Paternal Grandmother    • Alcohol abuse Paternal Grandfather    • COPD Paternal Grandfather      Social History     Socioeconomic History   • Marital status:      Spouse name: Not on file   • Number of children: Not on file   • Years of education: Not on file   • Highest education level: Not on file   Tobacco Use   • Smoking status: Former Smoker     Packs/day: 0.50     Years: 37.00     Pack years: 18.50     Last attempt to quit: 1981     Years since quittin.1   • Smokeless tobacco: Never Used   Substance and Sexual Activity   • Alcohol use: Yes     Alcohol/week: 7.2 oz     Types: 4 Glasses of wine, 10 Cans of beer, 2 Shots of liquor per week     Comment: Daily  "  • Drug use: No   • Sexual activity: Yes     Partners: Male     Birth control/protection: Post-menopausal, Surgical     Review of Systems  The following systems were reviewed and negative:  constitution, eyes, ENT, respiratory, cardiovascular, gastrointestinal, genitourinary, integument, breast, hematologic / lymphatic, musculoskeletal, neurological, behavioral/psych, endocrine and allergies / immunologic     Objective  Vitals:    02/05/19 1441   BP: 116/60   Weight: 48.1 kg (106 lb)   Height: 165.1 cm (65\")     Physical Exam:  General Appearance: alert, appears stated age and cooperative  Head: normocephalic, without obvious abnormality and atraumatic  Eyes: lids and lashes normal, conjunctivae and sclerae normal, no icterus, no pallor, corneas clear and PERRLA  Ears: ears appear intact with no abnormalities noted  Nose: nares normal, septum midline, mucosa normal and no drainage  Neck: suppple, trachea midline and no thyromegaly  Lungs: clear to auscultation, respirations regular, respirations even and respirations unlabored  Heart: regular rhythm and normal rate, normal S1, S2, no murmur, gallop, or rubs and no click  Breasts: Examined in supine position  Symmetric without masses or skin dimpling  Nipples normal without inversion, lesions or discharge  There are no palpable axillary nodes  Abdomen: normal bowel sounds, no masses, no hepatomegaly, no splenomegaly, soft non-tender, no guarding and no rebound tenderness  Pelvic: Clinical staff was present for exam  External genitalia:  normal appearance of the external genitalia including Bartholin's and [place]'s glands.  :  urethral meatus normal;  Vaginal:  normal pink mucosa without prolapse or lesions.  Cervix:  normal appearance.  Uterus:  normal size, shape and consistency.  Adnexa:  normal bimanual exam of the adnexa.  Pap smear done and specimen sent using Thin-Prep technique  Extremities: moves extremities well, no edema, no cyanosis and no redness  Skin: " no bleeding, bruising or rash and no lesions noted  Lymph Nodes: no palpable adenopathy  Neuro: CN II-X grossly intact; sensation intact  Psych: normal mood and affect, oriented to person, time and place, thought content organized and appropriate judgment  Lab Review   No data reviewed    Imaging   Mammogram report    Assessment/Plan  Problem List Items Addressed This Visit     None      Visit Diagnoses     Encounter for gynecological examination without abnormal finding    -  Primary  Pap was done today.  If she does not receive the results of the Pap within 2 weeks  time, she was instructed to call to find out the results.  I explained to Brea that the recommendations for Pap smear interval in a low risk patient has lengthened to 3 years time if cytology alone normal or  5 years time if both cytology and HPV testing were normal.  I encouraged her to be seen yearly for a full physical exam including breast and pelvic exam even during the off years when PAP's will not be performed.    Relevant Orders    Pap IG, Rfx HPV ASCU    Encounter for breast cancer screening other than mammogram      It is recommended per ACOG, for women at average risk to start annual mammogram screening at the age of 40 until the age of 75 and an individualized decision be made for women after age 75.  She was encouraged to continue getting yearly mammograms.  She should report any palpable breast lump(s) or skin changes regardless of mammographic findings.  I explained to Brea that notification regarding her mammogram results will come from the center performing the study.  Our office will not be routinely calling with mammogram results.  It is her responsibility to make sure that the results from the mammogram are communicated to her by the breast center.  If she has any questions about the results, she is welcome to call our office anytime.  Pt to schedule MMG.    Brea was counseled regarding having clinical breast exams and breast  self-awareness.  Women aged 29-39 years of age should have clinical breast exams every 1-3 years and yearly aged 40 and older.  The patient was counseled regarding breast self-awareness focusing on having a sense of what is normal for her breasts so that she can tell if there are changes.  Even small changes should be reported to provider.    Relevant Orders    Mammo Screening Digital Tomosynthesis Bilateral With CAD        Follow up as discussed/scheduled  This note was electronically signed.  Odalys Agudelo M.D.

## 2019-02-11 RX ORDER — FLUOXETINE 10 MG/1
30 CAPSULE ORAL DAILY
Qty: 90 CAPSULE | Refills: 1 | Status: SHIPPED | OUTPATIENT
Start: 2019-02-11 | End: 2021-02-10

## 2019-02-12 DIAGNOSIS — Z01.419 ENCOUNTER FOR GYNECOLOGICAL EXAMINATION WITHOUT ABNORMAL FINDING: ICD-10-CM

## 2019-04-04 ENCOUNTER — HOSPITAL ENCOUNTER (OUTPATIENT)
Dept: MAMMOGRAPHY | Facility: HOSPITAL | Age: 64
Discharge: HOME | End: 2019-04-04
Admitting: OBSTETRICS & GYNECOLOGY

## 2019-04-04 DIAGNOSIS — Z12.39 ENCOUNTER FOR BREAST CANCER SCREENING OTHER THAN MAMMOGRAM: ICD-10-CM

## 2019-04-04 PROCEDURE — 77063 BREAST TOMOSYNTHESIS BI: CPT

## 2019-04-04 PROCEDURE — 77067 SCR MAMMO BI INCL CAD: CPT

## 2019-04-04 PROCEDURE — 77067 SCR MAMMO BI INCL CAD: CPT | Performed by: RADIOLOGY

## 2019-04-04 PROCEDURE — 77063 BREAST TOMOSYNTHESIS BI: CPT | Performed by: RADIOLOGY

## 2019-07-05 ENCOUNTER — TELEPHONE (OUTPATIENT)
Dept: SURGERY | Facility: CLINIC | Age: 64
End: 2019-07-05

## 2019-07-08 ENCOUNTER — TELEPHONE (OUTPATIENT)
Dept: SURGERY | Facility: CLINIC | Age: 64
End: 2019-07-08

## 2019-07-08 RX ORDER — POLYETHYLENE GLYCOL 1450
POWDER (GRAM) MISCELLANEOUS
Qty: 238 G | Refills: 0 | Status: SHIPPED | OUTPATIENT
Start: 2019-07-08 | End: 2021-02-10

## 2019-07-08 RX ORDER — BISACODYL 5 MG/1
TABLET, DELAYED RELEASE ORAL
Qty: 4 TABLET | Refills: 0 | Status: SHIPPED | OUTPATIENT
Start: 2019-07-08 | End: 2021-02-10

## 2019-07-08 NOTE — TELEPHONE ENCOUNTER
"I scheduled pt for an open access screening colonoscopy @  on 08/13/2019.  All pertinent information mailed to pt.PRESCREENING FOR OPEN ACCESS SCHEDULING    Brea Perez, 1955  [id number]    07/08/19    If, the patient answers yes to any of the following questions the provider will be informed prior to scheduling open access for approval and documented in the chart.    [x]  Yes  [] No    1. Have you ever had a colonoscopy in the past?      When: 2016       Where:       Polyps or other:     [x]  Yes  [] No    2. Family history of colon cancer?      Relation: mgm      Age of onset:       Do you currently have any of the following?    []  Yes  [x] No  Rectal bleeding, if so, how long?     []  Yes  [x] No  Abdominal pain, if so, how long?    [x]  Yes  [] No  Constipation, if so, how long?pt stated \"that is normal for me.\"  []  Yes  [x] No  Diarrhea, if so, how long?    []  Yes  [x] No  Weight loss, is so, how much?    [] Yes  [x] No  Small caliber stool, if so, how long?      Have you ever had any of the following conditions?    [] Yes  [x] No  Heart attack?      When?       Last cardiac workup?     Blood thinners?    [] Yes  [x] No   Lung problems, asthma or COPD?  [] Yes  [x] No  Oxygen required?       [] Yes  [x] No  Stroke?     [] Yes  [x] No  Have you ever had a reaction to anesthesia?           "

## 2019-07-10 ENCOUNTER — TRANSCRIBE ORDERS (OUTPATIENT)
Dept: ADMINISTRATIVE | Facility: HOSPITAL | Age: 64
End: 2019-07-10

## 2019-07-10 ENCOUNTER — LAB (OUTPATIENT)
Dept: LAB | Facility: HOSPITAL | Age: 64
End: 2019-07-10

## 2019-07-10 ENCOUNTER — PREP FOR SURGERY (OUTPATIENT)
Dept: OTHER | Facility: HOSPITAL | Age: 64
End: 2019-07-10

## 2019-07-10 DIAGNOSIS — R79.0 CALCIUM BLOOD DECREASED: Primary | ICD-10-CM

## 2019-07-10 DIAGNOSIS — Z12.11 SCREENING FOR COLON CANCER: Primary | ICD-10-CM

## 2019-07-10 DIAGNOSIS — R79.0 CALCIUM BLOOD DECREASED: ICD-10-CM

## 2019-07-10 DIAGNOSIS — Z00.00 ROUTINE GENERAL MEDICAL EXAMINATION AT A HEALTH CARE FACILITY: ICD-10-CM

## 2019-07-10 LAB
ALBUMIN SERPL-MCNC: 4.6 G/DL (ref 3.5–5)
ALBUMIN/GLOB SERPL: 1.6 G/DL (ref 1–2)
ALP SERPL-CCNC: 44 U/L (ref 38–126)
ALT SERPL W P-5'-P-CCNC: 22 U/L (ref 13–69)
ANION GAP SERPL CALCULATED.3IONS-SCNC: 14.5 MMOL/L (ref 10–20)
AST SERPL-CCNC: 33 U/L (ref 15–46)
BILIRUB SERPL-MCNC: 1 MG/DL (ref 0.2–1.3)
BUN BLD-MCNC: 12 MG/DL (ref 7–20)
BUN/CREAT SERPL: 20 (ref 7.1–23.5)
CALCIUM SPEC-SCNC: 9 MG/DL (ref 8.4–10.2)
CHLORIDE SERPL-SCNC: 100 MMOL/L (ref 98–107)
CHOLEST SERPL-MCNC: 204 MG/DL (ref 0–199)
CO2 SERPL-SCNC: 27 MMOL/L (ref 26–30)
CREAT BLD-MCNC: 0.6 MG/DL (ref 0.6–1.3)
DEPRECATED RDW RBC AUTO: 41.4 FL (ref 37–54)
ERYTHROCYTE [DISTWIDTH] IN BLOOD BY AUTOMATED COUNT: 11.9 % (ref 12.3–15.4)
FERRITIN SERPL-MCNC: 135 NG/ML (ref 11.1–264)
GFR SERPL CREATININE-BSD FRML MDRD: 102 ML/MIN/1.73
GLOBULIN UR ELPH-MCNC: 2.9 GM/DL
GLUCOSE BLD-MCNC: 77 MG/DL (ref 74–98)
HCT VFR BLD AUTO: 37 % (ref 34–46.6)
HDLC SERPL-MCNC: 94 MG/DL (ref 40–60)
HGB BLD-MCNC: 12.1 G/DL (ref 12–15.9)
IRON 24H UR-MRATE: 131 MCG/DL (ref 37–181)
IRON SATN MFR SERPL: 49 % (ref 11–46)
LDLC SERPL CALC-MCNC: 101 MG/DL (ref 0–99)
LDLC/HDLC SERPL: 1.07 {RATIO}
MCH RBC QN AUTO: 30.8 PG (ref 26.6–33)
MCHC RBC AUTO-ENTMCNC: 32.7 G/DL (ref 31.5–35.7)
MCV RBC AUTO: 94.1 FL (ref 79–97)
PLATELET # BLD AUTO: 244 10*3/MM3 (ref 140–450)
PMV BLD AUTO: 8.8 FL (ref 6–12)
POTASSIUM BLD-SCNC: 4.5 MMOL/L (ref 3.5–5.1)
PROT SERPL-MCNC: 7.5 G/DL (ref 6.3–8.2)
RBC # BLD AUTO: 3.93 10*6/MM3 (ref 3.77–5.28)
SODIUM BLD-SCNC: 137 MMOL/L (ref 137–145)
TIBC SERPL-MCNC: 266 MCG/DL (ref 261–497)
TRIGL SERPL-MCNC: 47 MG/DL
VLDLC SERPL-MCNC: 9.4 MG/DL
WBC NRBC COR # BLD: 4.28 10*3/MM3 (ref 3.4–10.8)

## 2019-07-10 PROCEDURE — 83550 IRON BINDING TEST: CPT

## 2019-07-10 PROCEDURE — 85027 COMPLETE CBC AUTOMATED: CPT | Performed by: FAMILY MEDICINE

## 2019-07-10 PROCEDURE — 36415 COLL VENOUS BLD VENIPUNCTURE: CPT | Performed by: FAMILY MEDICINE

## 2019-07-10 PROCEDURE — 82728 ASSAY OF FERRITIN: CPT | Performed by: FAMILY MEDICINE

## 2019-07-10 PROCEDURE — 80053 COMPREHEN METABOLIC PANEL: CPT | Performed by: FAMILY MEDICINE

## 2019-07-10 PROCEDURE — 80061 LIPID PANEL: CPT | Performed by: FAMILY MEDICINE

## 2019-07-10 PROCEDURE — 83540 ASSAY OF IRON: CPT

## 2019-07-15 PROBLEM — Z12.11 SCREENING FOR COLON CANCER: Status: ACTIVE | Noted: 2019-07-15

## 2019-08-12 ENCOUNTER — TELEPHONE (OUTPATIENT)
Dept: SURGERY | Facility: CLINIC | Age: 64
End: 2019-08-12

## 2019-08-12 ENCOUNTER — ANESTHESIA EVENT (OUTPATIENT)
Dept: GASTROENTEROLOGY | Facility: HOSPITAL | Age: 64
End: 2019-08-12

## 2019-08-13 ENCOUNTER — ANESTHESIA (OUTPATIENT)
Dept: GASTROENTEROLOGY | Facility: HOSPITAL | Age: 64
End: 2019-08-13

## 2019-08-13 ENCOUNTER — HOSPITAL ENCOUNTER (OUTPATIENT)
Facility: HOSPITAL | Age: 64
Setting detail: HOSPITAL OUTPATIENT SURGERY
Discharge: HOME | End: 2019-08-13
Attending: SURGERY | Admitting: SURGERY

## 2019-08-13 VITALS
SYSTOLIC BLOOD PRESSURE: 91 MMHG | WEIGHT: 105 LBS | HEART RATE: 64 BPM | OXYGEN SATURATION: 99 % | BODY MASS INDEX: 17.49 KG/M2 | TEMPERATURE: 97.8 F | DIASTOLIC BLOOD PRESSURE: 58 MMHG | HEIGHT: 65 IN | RESPIRATION RATE: 20 BRPM

## 2019-08-13 PROCEDURE — S0260 H&P FOR SURGERY: HCPCS | Performed by: SURGERY

## 2019-08-13 PROCEDURE — 25010000002 MIDAZOLAM PER 1 MG: Performed by: NURSE ANESTHETIST, CERTIFIED REGISTERED

## 2019-08-13 PROCEDURE — 25010000002 PROPOFOL 10 MG/ML EMULSION: Performed by: NURSE ANESTHETIST, CERTIFIED REGISTERED

## 2019-08-13 RX ORDER — MEPERIDINE HYDROCHLORIDE 50 MG/ML
INJECTION INTRAMUSCULAR; INTRAVENOUS; SUBCUTANEOUS AS NEEDED
Status: DISCONTINUED | OUTPATIENT
Start: 2019-08-13 | End: 2019-08-13 | Stop reason: SURG

## 2019-08-13 RX ORDER — PROPOFOL 10 MG/ML
VIAL (ML) INTRAVENOUS AS NEEDED
Status: DISCONTINUED | OUTPATIENT
Start: 2019-08-13 | End: 2019-08-13 | Stop reason: SURG

## 2019-08-13 RX ORDER — MIDAZOLAM HYDROCHLORIDE 1 MG/ML
INJECTION INTRAMUSCULAR; INTRAVENOUS AS NEEDED
Status: DISCONTINUED | OUTPATIENT
Start: 2019-08-13 | End: 2019-08-13 | Stop reason: SURG

## 2019-08-13 RX ORDER — SODIUM CHLORIDE, SODIUM LACTATE, POTASSIUM CHLORIDE, CALCIUM CHLORIDE 600; 310; 30; 20 MG/100ML; MG/100ML; MG/100ML; MG/100ML
1000 INJECTION, SOLUTION INTRAVENOUS CONTINUOUS
Status: DISCONTINUED | OUTPATIENT
Start: 2019-08-13 | End: 2019-08-13 | Stop reason: HOSPADM

## 2019-08-13 RX ORDER — KETAMINE HCL IN NACL, ISO-OSM 100MG/10ML
SYRINGE (ML) INJECTION AS NEEDED
Status: DISCONTINUED | OUTPATIENT
Start: 2019-08-13 | End: 2019-08-13 | Stop reason: SURG

## 2019-08-13 RX ADMIN — PROPOFOL 30 MG: 10 INJECTION, EMULSION INTRAVENOUS at 07:35

## 2019-08-13 RX ADMIN — PROPOFOL 30 MG: 10 INJECTION, EMULSION INTRAVENOUS at 07:39

## 2019-08-13 RX ADMIN — SODIUM CHLORIDE, POTASSIUM CHLORIDE, SODIUM LACTATE AND CALCIUM CHLORIDE 1000 ML: 600; 310; 30; 20 INJECTION, SOLUTION INTRAVENOUS at 06:24

## 2019-08-13 RX ADMIN — MEPERIDINE HYDROCHLORIDE 25 MG: 50 INJECTION, SOLUTION INTRAMUSCULAR; INTRAVENOUS; SUBCUTANEOUS at 07:32

## 2019-08-13 RX ADMIN — MIDAZOLAM HYDROCHLORIDE 2 MG: 1 INJECTION, SOLUTION INTRAMUSCULAR; INTRAVENOUS at 07:27

## 2019-08-13 RX ADMIN — PROPOFOL 30 MG: 10 INJECTION, EMULSION INTRAVENOUS at 07:30

## 2019-08-13 RX ADMIN — Medication 25 MG: at 07:33

## 2019-08-13 RX ADMIN — PROPOFOL 30 MG: 10 INJECTION, EMULSION INTRAVENOUS at 07:42

## 2019-08-13 RX ADMIN — MEPERIDINE HYDROCHLORIDE 25 MG: 50 INJECTION, SOLUTION INTRAMUSCULAR; INTRAVENOUS; SUBCUTANEOUS at 07:29

## 2019-08-13 NOTE — DISCHARGE INSTRUCTIONS
No pushing, pulling, tugging,  heavy lifting, or strenuous activity.  No major decision making, driving, or drinking alcoholic beverages for 24 hours. ( due to the medications you have  received)  Always use good hand hygiene/washing techniques.  NO driving while taking pain medications.    To assist you in voiding:  Drink plenty of fluids  Listen to running water while attempting to void.    If you are unable to urinate and you have an uncomfortable urge to void or it has been   6 hours since you were discharged, return to the Emergency Room

## 2019-08-13 NOTE — ANESTHESIA PREPROCEDURE EVALUATION
Anesthesia Evaluation     Patient summary reviewed and Nursing notes reviewed   NPO Solid Status: > 8 hours  NPO Liquid Status: > 8 hours           Airway   Dental      Pulmonary    Cardiovascular     (+) hypertension,       Neuro/Psych  (+) psychiatric history,     GI/Hepatic/Renal/Endo      Musculoskeletal     Abdominal    Substance History      OB/GYN          Other                        Anesthesia Plan    ASA 2     MAC   (Risks and benefits discussed including risk of aspiration, recall and dental damage. All patient questions answered. Will continue with POC.)  intravenous induction   Anesthetic plan, all risks, benefits, and alternatives have been provided, discussed and informed consent has been obtained with: patient.

## 2019-08-13 NOTE — H&P
Reason for Consultation:  Screening colonoscopy / hx of colon polyps    Chief complaint :  Screening colonoscopy \  hx of colon polyps    SUBJECTIVE:    History of present illness:  I did see the patient today as a consultation for evaluation and treatment of a need for screening colonoscopy.  There are no other complaints of other than a previous history of colon polyps.    Review of Systems:    Review of Systems - General ROS: negative for - chills, fatigue, fever, hot flashes, malaise or night sweats  Psychological ROS: negative for - behavioral disorder, disorientation, hallucinations, hostility or mood swings  ENT ROS: negative for - nasal polyps, oral lesions, sinus pain, sneezing or sore throat  Breast ROS: negative for - galactorrhea or new or changing breast lumps  Respiratory ROS: negative for - hemoptysis, orthopnea, pleuritic pain, sputum changes or stridor  Cardiovascular ROS: negative for - dyspnea on exertion, edema, irregular heartbeat, murmur, orthopnea, palpitations or rapid heart rate  Gastrointestinal ROS: negative for - change in stools, gas/bloating, hematemesis, melena or stool incontinence.  Genito-Urinary ROS: negative for - dysuria, genital ulcers, nocturia or pelvic pain  Musculoskeletal ROS: negative for - gait disturbance or muscle pain  Neurological ROS: negative for - dizziness, gait disturbance, memory loss, numbness/tingling or seizures      Allergies:  No Known Allergies    Medications:    Current Facility-Administered Medications:   •  lactated ringers infusion 1,000 mL, 1,000 mL, Intravenous, Continuous, Jesus Manuel Martínez MD, Last Rate: 25 mL/hr at 08/13/19 0624, 1,000 mL at 08/13/19 0624    Facility-Administered Medications Ordered in Other Encounters:   •  ketamine hcl syringe solution prefilled syringe, , , PRN, Bandar Quintero CRNA, 25 mg at 08/13/19 0733  •  meperidine (DEMEROL) injection, , Intravenous, PRN, Bandar Quintero CRNA, 25 mg at 08/13/19 0732  •  midazolam  (VERSED) injection, , Intravenous, PRN, Bandar Quintero CRNA, 2 mg at 08/13/19 0727  •  Propofol (DIPRIVAN) injection, , Intravenous, PRN, Bandar Quintero CRNA, 30 mg at 08/13/19 0742    History:  Past Medical History:   Diagnosis Date   • Abnormal Pap smear of cervix 1996   • Anxiety    • Chronic constipation 1988   • Depression    • Essential hypertension    • Fibroid, uterine    • H/O mammogram 11/09/2015   • Hearing loss     Reported mild and that she does not have hearing aids at present time   • Herpes 10/30/2017   • History of colon polyps    • History of endometrial biopsy 08/08/2012    POST MENOPAUSAL BLEEDING WITH UTERINE LEIOMOMA   • History of Papanicolaou smear of cervix 12/22/2015    NORMAL    • Post-menopausal atrophic vaginitis    • Recurrent urinary tract infection    • Seasonal allergies    • Varicella    • Wears glasses     Rx glasses for reading and watching TV PRN       Past Surgical History:   Procedure Laterality Date   • COLON SURGERY  2011    Scar tissue removed secondary to colon surgery that was done in 2003   • COLONOSCOPY     • DILATATION AND CURETTAGE     • EXPLORATORY LAPAROTOMY  2003   • EYE SURGERY Left 2015    repeat to lasik procedure - patient unsure name of procedure but reported was to left eye only   • LAPAROSCOPIC CHOLECYSTECTOMY  2008   • LASIK  2008   • SMALL INTESTINE SURGERY  2003    SBO w/ resection & reanastamosis   • TONSILLECTOMY     • TUBAL ABDOMINAL LIGATION     • VAGINAL DELIVERY      x2   • WISDOM TOOTH EXTRACTION  1971       Family History   Problem Relation Age of Onset   • Arthritis Mother    • Hyperlipidemia Mother    • Dementia Mother    • Hypertension Mother    • Thyroid disease Mother    • Cancer Mother         skin   • Hypertension Father    • Alzheimer's disease Father    • Cancer Father         skin   • Melanoma Father    • Prostate cancer Father    • No Known Problems Sister    • No Known Problems Brother    • Cancer Maternal Uncle         lung   •  Cancer Maternal Grandmother         colon   • Colon cancer Maternal Grandmother    • COPD Maternal Grandfather    • Cerebral aneurysm Paternal Grandmother    • Alcohol abuse Paternal Grandfather    • COPD Paternal Grandfather    • Breast cancer Neg Hx    • Ovarian cancer Neg Hx        Social History     Tobacco Use   • Smoking status: Former Smoker     Packs/day: 0.50     Years: 37.00     Pack years: 18.50     Last attempt to quit: 1981     Years since quittin.6   • Smokeless tobacco: Never Used   Substance Use Topics   • Alcohol use: Yes     Alcohol/week: 2.4 oz     Types: 4 Cans of beer per week     Comment: Patient denies history of etoh abuse   • Drug use: No        OBJECTIVE:    Vital Signs   Temp:  [98.2 °F (36.8 °C)] 98.2 °F (36.8 °C)  Heart Rate:  [70] 70  Resp:  [18] 18  BP: (126)/(75) 126/75    Physical Exam:     General Appearance:    Alert, cooperative, in no acute distress   Head:    Normocephalic, without obvious abnormality, atraumatic   Eyes:            Lids and lashes normal, conjunctivae and sclerae normal, no   icterus, no pallor, corneas clear, PERRLA   Ears:    Ears appear intact with no abnormalities noted   Throat:   No oral lesions, no thrush, oral mucosa moist   Neck:   No adenopathy, supple, trachea midline, no thyromegaly, no   carotid bruit, no JVD   Back:     No kyphosis present, no scoliosis present, no skin lesions,      erythema or scars, no tenderness to percussion or                   palpation,   range of motion normal   Lungs:     Clear to auscultation,respirations regular, even and                  unlabored    Heart:    Regular rhythm and normal rate, normal S1 and S2, no            murmur, no gallop, no rub, no click   Chest Wall:    No abnormalities observed   Abdomen:     Normal bowel sounds, no masses, no organomegaly, soft        non-tender, non-distended, no guarding, there is no evidence of tenderness   Extremities:   Moves all extremities well, no edema, no  cyanosis, no             redness   Pulses:   Pulses palpable and equal bilaterally   Skin:   No bleeding, bruising or rash   Lymph nodes:   No palpable adenopathy   Neurologic:   Cranial nerves 2 - 12 grossly intact, sensation intact, DTR       present and equal bilaterally           ASSESSMENT/PLAN:    Screening colonoscopy  History of colon polyps      I did have a detailed and extensive discussion with the patient in the office today.  The full risks and benefits of operative versus nonoperative intervention were discussed with the paient, they understand, agree, and wish to proceed with the surgical treatment plan of colonoscopy.      Jesus Manuel Martínez MD

## 2019-08-13 NOTE — ANESTHESIA POSTPROCEDURE EVALUATION
Patient: Brea Perez    Procedure Summary     Date:  08/13/19 Room / Location:  [facility] ENDOSCOPY 3 / [facility] ENDOSCOPY    Anesthesia Start:  0724 Anesthesia Stop:      Procedure:  COLONOSCOPY (N/A ) Diagnosis:       Screening for colon cancer      (Screening for colon cancer [Z12.11])    Surgeon:  Jesus Manuel Martínez MD Provider:  Bandar Quintero CRNA    Anesthesia Type:  MAC ASA Status:  2          Anesthesia Type: MAC  Last vitals  BP   126/75 (08/13/19 0615)   Temp   98.2 °F (36.8 °C) (08/13/19 0615)   Pulse   70 (08/13/19 0615)   Resp   18 (08/13/19 0615)     SpO2   96 % (08/13/19 0615)     Post Anesthesia Care and Evaluation    Patient location during evaluation: PHASE II  Patient participation: complete - patient participated  Level of consciousness: awake  Pain score: 0  Pain management: adequate  Airway patency: patent  Anesthetic complications: No anesthetic complications  PONV Status: none  Cardiovascular status: acceptable  Respiratory status: acceptable and nasal cannula  Hydration status: acceptable    Comments: vsss resp spont, reflexes intact, responsive, report given to pacu nurse

## 2020-02-07 ENCOUNTER — OFFICE VISIT (OUTPATIENT)
Dept: OBSTETRICS AND GYNECOLOGY | Facility: CLINIC | Age: 65
End: 2020-02-07

## 2020-02-07 VITALS
BODY MASS INDEX: 17.33 KG/M2 | SYSTOLIC BLOOD PRESSURE: 110 MMHG | DIASTOLIC BLOOD PRESSURE: 70 MMHG | HEIGHT: 65 IN | WEIGHT: 104 LBS

## 2020-02-07 DIAGNOSIS — Z12.39 ENCOUNTER FOR BREAST CANCER SCREENING OTHER THAN MAMMOGRAM: ICD-10-CM

## 2020-02-07 DIAGNOSIS — Z01.419 ENCOUNTER FOR GYNECOLOGICAL EXAMINATION WITHOUT ABNORMAL FINDING: Primary | ICD-10-CM

## 2020-02-07 PROCEDURE — 99396 PREV VISIT EST AGE 40-64: CPT | Performed by: OBSTETRICS & GYNECOLOGY

## 2020-02-08 NOTE — PROGRESS NOTES
Subjective  Chief Complaint   Patient presents with   • Gynecologic Exam     Last pap 2019 WNL, Last Mammogram 19, Needs order for Mammogram     Patient is 64 y.o.  here for her annual examination.  The patient is without complaints.  The patient will be due for her mammogram in April.  Patient sees Dr. Marin for her primary care.  Patient reports no changes in her health or medications.  Patient did have a bone density scan in .  The patient did have a colonoscopy last year.    History  Past Medical History:   Diagnosis Date   • Abnormal Pap smear of cervix    • Anxiety    • Chronic constipation    • Depression    • Essential hypertension    • Fibroid, uterine    • H/O mammogram 2015   • Hearing loss     Reported mild and that she does not have hearing aids at present time   • Herpes 10/30/2017   • History of colon polyps    • History of endometrial biopsy 2012    POST MENOPAUSAL BLEEDING WITH UTERINE LEIOMOMA   • History of Papanicolaou smear of cervix 2015    NORMAL    • Post-menopausal atrophic vaginitis    • Recurrent urinary tract infection    • Seasonal allergies    • Varicella    • Wears glasses     Rx glasses for reading and watching TV PRN     Current Outpatient Medications on File Prior to Visit   Medication Sig Dispense Refill   • bisacodyl (DULCOLAX) 5 MG EC tablet Take 2 @ 3pm, 2 @ 7 pm day prior to colonoscopy (Patient taking differently: Take 10 mg by mouth Take As Directed. Take 2 @ 3pm, 2 @ 7 pm day prior to colonoscopy) 4 tablet 0   • busPIRone (BUSPAR) 10 MG tablet Take 1 tablet by mouth 2 (Two) Times a Day. 60 tablet 5   • cholecalciferol (VITAMIN D3) 1000 units tablet Take 1,000 Units by mouth Daily.     • FLUoxetine (PROzac) 10 MG capsule take 3 capsules by mouth daily (Patient taking differently: Take 20 mg by mouth Daily.) 90 capsule 1   • hepatitis A (HAVRIX) 1440 EL U/ML vaccine Havrix (PF) 1,440 ELIZ unit/mL intramuscular syringe     •  Multiple Vitamins-Minerals (CENTRUM SILVER ADULT 50+ PO) Take 1 tablet by mouth Daily.     • Multiple Vitamins-Minerals (OCUVITE ADULT 50+ PO) Take 1 tablet by mouth Daily.     • Omega-3 350 MG capsule delayed-release Take 350 mg by mouth Daily. With 1200mg fish oil - combination OTC supplement     • Ospemifene 60 MG tablet Take 1 tablet by mouth Daily. (Patient taking differently: Take 60 mg by mouth 2 (Two) Times a Week.) 30 tablet 11   • Polyethylene Glycol powder Clear liquid diet all day prior to colonoscopy. Mix 238g with 64 ounces of clear liquid, at 5 pm drink one glass q 10-15 mins until consumed (Patient taking differently: Take  by mouth Take As Directed. Clear liquid diet all day prior to colonoscopy. Mix 238g with 64 ounces of clear liquid, at 5 pm drink one glass q 10-15 mins until consumed) 238 g 0   • traZODone (DESYREL) 100 MG tablet Take 1 tablet by mouth Every Night. 90 tablet 3   • valACYclovir (VALTREX) 500 MG tablet Take 1 tablet by mouth Daily. (Patient taking differently: Take 500 mg by mouth Take As Directed. To begin one tablet daily at onset of fever blister) 90 tablet 3     No current facility-administered medications on file prior to visit.      No Known Allergies  Past Surgical History:   Procedure Laterality Date   • COLON SURGERY  2011    Scar tissue removed secondary to colon surgery that was done in 2003   • COLONOSCOPY     • COLONOSCOPY N/A 8/13/2019    Procedure: COLONOSCOPY;  Surgeon: Jesus Manuel Martínez MD;  Location: [facility] ENDOSCOPY;  Service: Gastroenterology   • DILATATION AND CURETTAGE     • EXPLORATORY LAPAROTOMY  2003   • EYE SURGERY Left 2015    repeat to lasik procedure - patient unsure name of procedure but reported was to left eye only   • LAPAROSCOPIC CHOLECYSTECTOMY  2008   • LASIK  2008   • SMALL INTESTINE SURGERY  2003    SBO w/ resection & reanastamosis   • TONSILLECTOMY     • TUBAL ABDOMINAL LIGATION     • VAGINAL DELIVERY      x2   • WISDOM TOOTH EXTRACTION  1971  "    Family History   Problem Relation Age of Onset   • Arthritis Mother    • Hyperlipidemia Mother    • Dementia Mother    • Hypertension Mother    • Thyroid disease Mother    • Cancer Mother         skin   • Hypertension Father    • Alzheimer's disease Father    • Cancer Father         skin   • Melanoma Father    • Prostate cancer Father    • No Known Problems Sister    • No Known Problems Brother    • Cancer Maternal Uncle         lung   • Cancer Maternal Grandmother         colon   • Colon cancer Maternal Grandmother    • COPD Maternal Grandfather    • Cerebral aneurysm Paternal Grandmother    • Alcohol abuse Paternal Grandfather    • COPD Paternal Grandfather    • Breast cancer Neg Hx    • Ovarian cancer Neg Hx      Social History     Socioeconomic History   • Marital status:      Spouse name: Not on file   • Number of children: Not on file   • Years of education: Not on file   • Highest education level: Not on file   Tobacco Use   • Smoking status: Former Smoker     Packs/day: 0.50     Years: 37.00     Pack years: 18.50     Last attempt to quit: 1981     Years since quittin.1   • Smokeless tobacco: Never Used   Substance and Sexual Activity   • Alcohol use: Yes     Alcohol/week: 4.0 standard drinks     Types: 4 Cans of beer per week     Comment: Patient denies history of etoh abuse   • Drug use: No   • Sexual activity: Yes     Partners: Male     Birth control/protection: Post-menopausal, Surgical     Review of Systems  The following systems were reviewed and negative:  constitution, eyes, ENT, respiratory, cardiovascular, gastrointestinal, genitourinary, integument, breast, hematologic / lymphatic, musculoskeletal, neurological, behavioral/psych, endocrine and allergies / immunologic     Objective  Vitals:    20 1336   BP: 110/70   Weight: 47.2 kg (104 lb)   Height: 165.1 cm (65\")     Physical Exam:  General Appearance: alert, appears stated age and cooperative  Head: normocephalic, " without obvious abnormality and atraumatic  Eyes: lids and lashes normal, conjunctivae and sclerae normal, no icterus, no pallor, corneas clear and PERRLA  Ears: ears appear intact with no abnormalities noted  Nose: nares normal, septum midline, mucosa normal and no drainage  Neck: suppple, trachea midline and no thyromegaly  Lungs: clear to auscultation, respirations regular, respirations even and respirations unlabored  Heart: regular rhythm and normal rate, normal S1, S2, no murmur, gallop, or rubs and no click  Breasts: Examined in supine position  Symmetric without masses or skin dimpling  Nipples normal without inversion, lesions or discharge  There are no palpable axillary nodes  Abdomen: normal bowel sounds, no masses, no hepatomegaly, no splenomegaly, soft non-tender, no guarding and no rebound tenderness  Pelvic: Clinical staff was present for exam  External genitalia:  normal appearance of the external genitalia including Bartholin's and [place]'s glands.  :  urethral meatus normal;  Vaginal:  atrophic mucosal changes are present;  Cervix:  normal appearance.  Uterus:  normal size, shape and consistency.  Adnexa:  non palpable bilaterally.  Pap smear done and specimen sent using Thin-Prep technique  Extremities: moves extremities well, no edema, no cyanosis and no redness  Skin: no bleeding, bruising or rash and no lesions noted  Lymph Nodes: no palpable adenopathy  Neuro: CN II-X grossly intact; sensation intact  Psych: normal mood and affect, oriented to person, time and place, thought content organized and appropriate judgment  Lab Review   No data reviewed    Imaging   Mammogram report    Decision to Obtain Medical Records  No    Summary of Medical Records  No    Assessment/Plan  Problem List Items Addressed This Visit     None      Visit Diagnoses     Encounter for gynecological examination without abnormal finding    -  Primary  Pap was done today.  If she does not receive the results of the Pap  within 2 weeks  time, she was instructed to call to find out the results.  I explained to Brea that the recommendations for Pap smear interval in a low risk patient has lengthened to 3 years time if cytology alone normal or  5 years time if both cytology and HPV testing were normal.  I encouraged her to be seen yearly for a full physical exam including breast and pelvic exam even during the off years when PAP's will not be performed.    Relevant Orders    Pap IG, Rfx HPV ASCU    Encounter for breast cancer screening other than mammogram      It is recommended per ACOG, for women at average risk to start annual mammogram screening at the age of 40 until the age of 75 and an individualized decision be made for women after age 75.  She was encouraged to continue getting yearly mammograms.  She should report any palpable breast lump(s) or skin changes regardless of mammographic findings.  I explained to Brea that notification regarding her mammogram results will come from the center performing the study.  Our office will not be routinely calling with mammogram results.  It is her responsibility to make sure that the results from the mammogram are communicated to her by the breast center.  If she has any questions about the results, she is welcome to call our office anytime.  Pt to schedule.    Brea was counseled regarding having clinical breast exams and breast self-awareness.  Women aged 29-39 years of age should have clinical breast exams every 1-3 years and yearly aged 40 and older.  The patient was counseled regarding breast self-awareness focusing on having a sense of what is normal for her breasts so that she can tell if there are changes.  Even small changes should be reported to provider.    Relevant Orders    Mammo Screening Digital Tomosynthesis Bilateral With CAD        Follow up as discussed/scheduled  Note: Speech recognition transcription software may have been used to dictate portions of this document.   An attempt at proofreading has been made though minor errors in transcription may still be present.  This note was electronically signed.  Odalys Agudelo M.D.

## 2020-02-13 DIAGNOSIS — Z01.419 ENCOUNTER FOR GYNECOLOGICAL EXAMINATION WITHOUT ABNORMAL FINDING: ICD-10-CM

## 2020-04-06 ENCOUNTER — APPOINTMENT (OUTPATIENT)
Dept: MAMMOGRAPHY | Facility: HOSPITAL | Age: 65
End: 2020-04-06

## 2020-05-08 ENCOUNTER — APPOINTMENT (OUTPATIENT)
Dept: MAMMOGRAPHY | Facility: HOSPITAL | Age: 65
End: 2020-05-08

## 2020-05-13 ENCOUNTER — HOSPITAL ENCOUNTER (OUTPATIENT)
Dept: MAMMOGRAPHY | Facility: HOSPITAL | Age: 65
Discharge: HOME | End: 2020-05-13
Admitting: OBSTETRICS & GYNECOLOGY

## 2020-05-13 DIAGNOSIS — Z12.39 ENCOUNTER FOR BREAST CANCER SCREENING OTHER THAN MAMMOGRAM: ICD-10-CM

## 2020-05-13 PROCEDURE — 77067 SCR MAMMO BI INCL CAD: CPT

## 2020-05-13 PROCEDURE — 77063 BREAST TOMOSYNTHESIS BI: CPT

## 2021-02-10 ENCOUNTER — OFFICE VISIT (OUTPATIENT)
Dept: OBSTETRICS AND GYNECOLOGY | Facility: CLINIC | Age: 66
End: 2021-02-10

## 2021-02-10 VITALS
BODY MASS INDEX: 19.33 KG/M2 | WEIGHT: 116 LBS | SYSTOLIC BLOOD PRESSURE: 120 MMHG | DIASTOLIC BLOOD PRESSURE: 70 MMHG | HEIGHT: 65 IN

## 2021-02-10 DIAGNOSIS — Z01.419 ENCOUNTER FOR GYNECOLOGICAL EXAMINATION (GENERAL) (ROUTINE) WITHOUT ABNORMAL FINDINGS: Primary | ICD-10-CM

## 2021-02-10 DIAGNOSIS — Z12.31 ENCOUNTER FOR SCREENING MAMMOGRAM FOR BREAST CANCER: ICD-10-CM

## 2021-02-10 PROCEDURE — 99396 PREV VISIT EST AGE 40-64: CPT | Performed by: OBSTETRICS & GYNECOLOGY

## 2021-02-10 RX ORDER — MULTIVIT-MIN/IRON/FOLIC ACID/K 18-600-40
CAPSULE ORAL
COMMUNITY
Start: 2020-03-16

## 2021-02-10 RX ORDER — BUPROPION HYDROCHLORIDE 150 MG/1
150 TABLET ORAL DAILY
COMMUNITY
Start: 2021-02-01 | End: 2022-02-11

## 2021-02-10 RX ORDER — SERTRALINE HYDROCHLORIDE 100 MG/1
100 TABLET, FILM COATED ORAL NIGHTLY
COMMUNITY
Start: 2020-12-07 | End: 2022-02-11

## 2021-02-10 RX ORDER — AMITRIPTYLINE HYDROCHLORIDE 50 MG/1
50 TABLET, FILM COATED ORAL NIGHTLY
COMMUNITY
Start: 2020-11-16

## 2021-02-10 NOTE — PROGRESS NOTES
"Chief Complaint  Gynecologic Exam     History of Present Illness:  Patient is 65 y.o.  who presents to [facility] for her annual examination.  Patient is without complaints.  Patient had her mammogram in May of last year.  Patient had a bone density scan in .  Patient had osteopenia at that time.  She reports she did have a follow-up bone density scan at Dr. Hung's office.  Patient sees Dr. Best for her primary care.  Patient had a colonoscopy in .  Patient reports no changes in her health or medications.    Physical Examination:  Vital Signs: /70   Ht 165.1 cm (65\")   Wt 52.6 kg (116 lb)   BMI 19.30 kg/m²     General Appearance: alert, appears stated age, and cooperative  Breasts: Examined in supine position  Symmetric without masses or skin dimpling  Nipples normal without inversion, lesions or discharge  There are no palpable axillary nodes  Abdomen: no masses, no hepatomegaly, no splenomegaly, soft non-tender, no guarding and no rebound tenderness  Pelvic: Clinical staff was present for exam  External genitalia:  normal appearance of the external genitalia including Bartholin's and [place]'s glands.  :  urethral meatus normal;  Vaginal:  atrophic mucosal changes are present;  Cervix:  normal appearance.  Uterus:  normal size, shape and consistency.  Adnexa:  non palpable bilaterally.  Pap smear done and specimen sent using Thin-Prep technique    Data Review:  The following data was reviewed by: Odalys Agudelo MD on 02/10/2021:     Labs:    Imaging:  Mammo Screening Digital Tomosynthesis Bilateral With CAD (2020 13:35)  SCANNED - DEXA (2016)    Medical Records:  None    Assessment and Plan   Problem List Items Addressed This Visit     None      Visit Diagnoses     Encounter for gynecological examination (general) (routine) without abnormal findings    -  Primary  Pap was done today.  If she does not receive the results of the Pap " within 2 weeks  time, she was instructed to call to find out the results.  I explained to Brea that the recommendations for Pap smear interval in a low risk patient has lengthened to 3 years time if cytology alone normal or  5 years time if both cytology and HPV testing were normal.  I encouraged her to be seen yearly for a full physical exam including breast and pelvic exam even during the off years when PAP's will not be performed.    Relevant Orders    Liquid-based Pap Smear, Screening    Encounter for screening mammogram for breast cancer      It is recommended per ACOG, for women at average risk to start annual mammogram screening at the age of 40 until the age of 75 and an individualized decision be made for women after age 75.  She was encouraged to continue getting yearly mammograms.  She should report any palpable breast lump(s) or skin changes regardless of mammographic findings.  I explained to Brea that notification regarding her mammogram results will come from the center performing the study.  Our office will not be routinely calling with mammogram results.  It is her responsibility to make sure that the results from the mammogram are communicated to her by the breast center.  If she has any questions about the results, she is welcome to call our office anytime.  The patient reports she will schedule her mammogram.    Brea was counseled regarding having clinical breast exams and breast self-awareness.  Women aged 29-39 years of age should have clinical breast exams every 1-3 years and yearly aged 40 and older.  The patient was counseled regarding breast self-awareness focusing on having a sense of what is normal for her breasts so that she can tell if there are changes.  Even small changes should be reported to provider.    Relevant Orders    Mammo Screening Digital Tomosynthesis Bilateral With CAD          Follow Up/Instructions:    Patient was given instructions and counseling regarding her  condition or for health maintenance advice. Please see specific information pulled into the AVS if appropriate.     Note: Speech recognition transcription software may have been used to dictate portions of this document.  An attempt at proofreading has been made though minor errors in transcription may still be present.    This note was electronically signed.  Odalys Agudelo M.D.

## 2021-02-19 DIAGNOSIS — Z01.419 ENCOUNTER FOR GYNECOLOGICAL EXAMINATION (GENERAL) (ROUTINE) WITHOUT ABNORMAL FINDINGS: ICD-10-CM

## 2021-05-19 ENCOUNTER — HOSPITAL ENCOUNTER (OUTPATIENT)
Dept: MAMMOGRAPHY | Facility: HOSPITAL | Age: 66
Discharge: HOME | End: 2021-05-19
Admitting: OBSTETRICS & GYNECOLOGY

## 2021-05-19 DIAGNOSIS — Z12.31 ENCOUNTER FOR SCREENING MAMMOGRAM FOR BREAST CANCER: ICD-10-CM

## 2021-05-19 PROCEDURE — 77063 BREAST TOMOSYNTHESIS BI: CPT

## 2021-05-19 PROCEDURE — 77067 SCR MAMMO BI INCL CAD: CPT

## 2021-08-02 ENCOUNTER — TRANSCRIBE ORDERS (OUTPATIENT)
Dept: LAB | Facility: HOSPITAL | Age: 66
End: 2021-08-02

## 2021-08-02 ENCOUNTER — LAB (OUTPATIENT)
Dept: LAB | Facility: HOSPITAL | Age: 66
End: 2021-08-02

## 2021-08-02 DIAGNOSIS — E55.9 AVITAMINOSIS D: ICD-10-CM

## 2021-08-02 DIAGNOSIS — Z00.00 ROUTINE GENERAL MEDICAL EXAMINATION AT A HEALTH CARE FACILITY: ICD-10-CM

## 2021-08-02 DIAGNOSIS — R00.2 PALPITATIONS: ICD-10-CM

## 2021-08-02 DIAGNOSIS — Z00.00 ROUTINE GENERAL MEDICAL EXAMINATION AT A HEALTH CARE FACILITY: Primary | ICD-10-CM

## 2021-08-02 LAB
25(OH)D3 SERPL-MCNC: 63.6 NG/ML (ref 30–100)
ALBUMIN SERPL-MCNC: 4.4 G/DL (ref 3.5–5.2)
ALBUMIN/GLOB SERPL: 1.6 G/DL
ALP SERPL-CCNC: 62 U/L (ref 39–117)
ALT SERPL W P-5'-P-CCNC: 11 U/L (ref 1–33)
ANION GAP SERPL CALCULATED.3IONS-SCNC: 9.9 MMOL/L (ref 5–15)
AST SERPL-CCNC: 19 U/L (ref 1–32)
BILIRUB SERPL-MCNC: 0.4 MG/DL (ref 0–1.2)
BUN SERPL-MCNC: 13 MG/DL (ref 8–23)
BUN/CREAT SERPL: 18.6 (ref 7–25)
CALCIUM SPEC-SCNC: 9.1 MG/DL (ref 8.6–10.5)
CHLORIDE SERPL-SCNC: 101 MMOL/L (ref 98–107)
CHOLEST SERPL-MCNC: 221 MG/DL (ref 0–200)
CO2 SERPL-SCNC: 27.1 MMOL/L (ref 22–29)
CREAT SERPL-MCNC: 0.7 MG/DL (ref 0.57–1)
DEPRECATED RDW RBC AUTO: 42.8 FL (ref 37–54)
ERYTHROCYTE [DISTWIDTH] IN BLOOD BY AUTOMATED COUNT: 12.4 % (ref 12.3–15.4)
GFR SERPL CREATININE-BSD FRML MDRD: 85 ML/MIN/1.73
GLOBULIN UR ELPH-MCNC: 2.8 GM/DL
GLUCOSE SERPL-MCNC: 97 MG/DL (ref 65–99)
HCT VFR BLD AUTO: 40.2 % (ref 34–46.6)
HDLC SERPL-MCNC: 73 MG/DL (ref 40–60)
HGB BLD-MCNC: 13.3 G/DL (ref 12–15.9)
LDLC SERPL CALC-MCNC: 135 MG/DL (ref 0–100)
LDLC/HDLC SERPL: 1.83 {RATIO}
MCH RBC QN AUTO: 31.2 PG (ref 26.6–33)
MCHC RBC AUTO-ENTMCNC: 33.1 G/DL (ref 31.5–35.7)
MCV RBC AUTO: 94.4 FL (ref 79–97)
PLATELET # BLD AUTO: 277 10*3/MM3 (ref 140–450)
PMV BLD AUTO: 9 FL (ref 6–12)
POTASSIUM SERPL-SCNC: 4.2 MMOL/L (ref 3.5–5.2)
PROT SERPL-MCNC: 7.2 G/DL (ref 6–8.5)
RBC # BLD AUTO: 4.26 10*6/MM3 (ref 3.77–5.28)
SODIUM SERPL-SCNC: 138 MMOL/L (ref 136–145)
T4 FREE SERPL-MCNC: 0.87 NG/DL (ref 0.93–1.7)
TRIGL SERPL-MCNC: 73 MG/DL (ref 0–150)
TSH SERPL DL<=0.05 MIU/L-ACNC: 2.82 UIU/ML (ref 0.27–4.2)
VLDLC SERPL-MCNC: 13 MG/DL (ref 5–40)
WBC # BLD AUTO: 4.92 10*3/MM3 (ref 3.4–10.8)

## 2021-08-02 PROCEDURE — 85027 COMPLETE CBC AUTOMATED: CPT

## 2021-08-02 PROCEDURE — 80061 LIPID PANEL: CPT

## 2021-08-02 PROCEDURE — 80053 COMPREHEN METABOLIC PANEL: CPT

## 2021-08-02 PROCEDURE — 36415 COLL VENOUS BLD VENIPUNCTURE: CPT

## 2021-08-02 PROCEDURE — 84443 ASSAY THYROID STIM HORMONE: CPT

## 2021-08-02 PROCEDURE — 82306 VITAMIN D 25 HYDROXY: CPT

## 2021-08-02 PROCEDURE — 84439 ASSAY OF FREE THYROXINE: CPT

## 2022-02-11 ENCOUNTER — OFFICE VISIT (OUTPATIENT)
Dept: OBSTETRICS AND GYNECOLOGY | Facility: CLINIC | Age: 67
End: 2022-02-11

## 2022-02-11 VITALS
HEIGHT: 67 IN | WEIGHT: 127 LBS | SYSTOLIC BLOOD PRESSURE: 120 MMHG | DIASTOLIC BLOOD PRESSURE: 60 MMHG | BODY MASS INDEX: 19.93 KG/M2

## 2022-02-11 DIAGNOSIS — N95.2 POSTMENOPAUSAL ATROPHIC VAGINITIS: ICD-10-CM

## 2022-02-11 DIAGNOSIS — N94.10 DYSPAREUNIA, FEMALE: ICD-10-CM

## 2022-02-11 DIAGNOSIS — A60.04 HERPES SIMPLEX VULVOVAGINITIS: ICD-10-CM

## 2022-02-11 DIAGNOSIS — Z01.419 ENCOUNTER FOR GYNECOLOGICAL EXAMINATION (GENERAL) (ROUTINE) WITHOUT ABNORMAL FINDINGS: Primary | ICD-10-CM

## 2022-02-11 DIAGNOSIS — Z12.31 ENCOUNTER FOR SCREENING MAMMOGRAM FOR BREAST CANCER: ICD-10-CM

## 2022-02-11 PROCEDURE — 99396 PREV VISIT EST AGE 40-64: CPT | Performed by: OBSTETRICS & GYNECOLOGY

## 2022-02-11 PROCEDURE — 99214 OFFICE O/P EST MOD 30 MIN: CPT | Performed by: OBSTETRICS & GYNECOLOGY

## 2022-02-11 RX ORDER — VALACYCLOVIR HYDROCHLORIDE 500 MG/1
500 TABLET, FILM COATED ORAL 2 TIMES DAILY PRN
COMMUNITY

## 2022-02-11 RX ORDER — ESTRADIOL 10 UG/1
1 INSERT VAGINAL 2 TIMES WEEKLY
Qty: 8 TABLET | Refills: 11 | Status: SHIPPED | OUTPATIENT
Start: 2022-02-14 | End: 2023-02-15 | Stop reason: SDUPTHER

## 2022-02-11 RX ORDER — LAMOTRIGINE 25 MG/1
200 TABLET ORAL DAILY
COMMUNITY
Start: 2022-02-01 | End: 2022-08-10

## 2022-02-11 NOTE — PROGRESS NOTES
Chief Complaint  Gynecologic Exam     History of Present Illness:  Patient is 66 y.o.  who presents to [facility] here for her annual examination.  Patient had her last Pap smear in February of last year.  Patient did have her mammogram in May.  Patient does have complaints of vaginal dryness as well as dyspareunia.  Patient has been on Osphena for several years.  Patient reports her insurance is no longer covering her medication.  Patient would like to consider other alternatives.  Patient reports no major changes in her health or medications otherwise.  Patient sees Dr. Mcbride for her primary care.  Patient has continued on her Valtrex for suppression HSV.    History  Past Medical History:   Diagnosis Date   • Abnormal Pap smear of cervix    • Anxiety    • Chronic constipation    • Depression    • Essential hypertension    • Fibroid, uterine    • H/O mammogram 2015   • Hearing loss     Reported mild and that she does not have hearing aids at present time   • Herpes 10/30/2017   • History of colon polyps    • History of endometrial biopsy 2012    POST MENOPAUSAL BLEEDING WITH UTERINE LEIOMOMA   • History of Papanicolaou smear of cervix 2015    NORMAL    • Post-menopausal atrophic vaginitis    • Recurrent urinary tract infection    • Seasonal allergies    • Varicella    • Wears glasses     Rx glasses for reading and watching TV PRN     Current Outpatient Medications on File Prior to Visit   Medication Sig Dispense Refill   • amitriptyline (ELAVIL) 50 MG tablet      • Ascorbic Acid (Vitamin C) 500 MG capsule      • busPIRone (BUSPAR) 10 MG tablet Take 1 tablet by mouth 2 (Two) Times a Day. 60 tablet 5   • Calcium Carb-Cholecalciferol (CALCIUM 500 + D3 PO)      • lamoTRIgine (LaMICtal) 25 MG tablet TAKE 1 TABLET BY MOUTH DAILY FOR 1 WEEK THEN 1 TWICE DAILY FOR 1 WEEK THEN 1 EVERY MORNING AND 2 EVERY EVENING FOR 1 WEEK THEN 2 TWICE DAILY     • Multiple  Vitamins-Minerals (CENTRUM SILVER ADULT 50+ PO) Take 1 tablet by mouth Daily.     • Multiple Vitamins-Minerals (OCUVITE ADULT 50+ PO) Take 1 tablet by mouth Daily.     • Omega-3 350 MG capsule delayed-release Take 350 mg by mouth Daily. With 1200mg fish oil - combination OTC supplement     • valACYclovir (VALTREX) 500 MG tablet Take 500 mg by mouth 2 (Two) Times a Day.     • buPROPion XL (WELLBUTRIN XL) 150 MG 24 hr tablet Take 150 mg by mouth Daily.     • [DISCONTINUED] Ospemifene 60 MG tablet Take 1 tablet by mouth Daily. (Patient taking differently: Take 60 mg by mouth 2 (Two) Times a Week.) 30 tablet 11   • [DISCONTINUED] sertraline (ZOLOFT) 100 MG tablet Take 100 mg by mouth Every Night.       No current facility-administered medications on file prior to visit.     No Known Allergies  Past Surgical History:   Procedure Laterality Date   • COLON SURGERY  2011    Scar tissue removed secondary to colon surgery that was done in 2003   • COLONOSCOPY     • COLONOSCOPY N/A 8/13/2019    Procedure: COLONOSCOPY;  Surgeon: Jesus Manuel Martínez MD;  Location: [facility] ENDOSCOPY;  Service: Gastroenterology   • DILATATION AND CURETTAGE     • EXPLORATORY LAPAROTOMY  2003   • EYE SURGERY Left 2015    repeat to lasik procedure - patient unsure name of procedure but reported was to left eye only   • LAPAROSCOPIC CHOLECYSTECTOMY  2008   • LASIK  2008   • SMALL INTESTINE SURGERY  2003    SBO w/ resection & reanastamosis   • TONSILLECTOMY     • TUBAL ABDOMINAL LIGATION     • VAGINAL DELIVERY      x2   • WISDOM TOOTH EXTRACTION  1971     Family History   Problem Relation Age of Onset   • Arthritis Mother    • Hyperlipidemia Mother    • Dementia Mother    • Hypertension Mother    • Thyroid disease Mother    • Cancer Mother         skin   • Hypertension Father    • Alzheimer's disease Father    • Cancer Father         skin   • Melanoma Father    • Prostate cancer Father    • No Known Problems Sister    • No Known Problems Brother    •  "Cancer Maternal Uncle         lung   • Cancer Maternal Grandmother         colon   • Colon cancer Maternal Grandmother    • COPD Maternal Grandfather    • Cerebral aneurysm Paternal Grandmother    • Alcohol abuse Paternal Grandfather    • COPD Paternal Grandfather    • Breast cancer Neg Hx    • Ovarian cancer Neg Hx      Social History     Socioeconomic History   • Marital status:    Tobacco Use   • Smoking status: Former Smoker     Packs/day: 0.25     Years: 5.00     Pack years: 1.25     Types: Cigarettes     Quit date: 1981     Years since quittin.1   • Smokeless tobacco: Never Used   Vaping Use   • Vaping Use: Never used   Substance and Sexual Activity   • Alcohol use: Yes     Alcohol/week: 4.0 standard drinks     Types: 4 Cans of beer per week     Comment: Patient denies history of etoh abuse   • Drug use: No   • Sexual activity: Yes     Partners: Male     Birth control/protection: Surgical       Physical Examination:  Vital Signs: /60   Ht 170.2 cm (67\")   Wt 57.6 kg (127 lb)   BMI 19.89 kg/m²     General Appearance: alert, appears stated age, and cooperative  Breasts: Examined in supine position  Symmetric without masses or skin dimpling  Nipples normal without inversion, lesions or discharge  There are no palpable axillary nodes  Abdomen: no masses, no hepatomegaly, no splenomegaly, soft non-tender, no guarding and no rebound tenderness  Pelvic: Clinical staff was present for exam  External genitalia:  normal appearance of the external genitalia including Bartholin's and [place]'s glands.  :  urethral meatus normal;  Vaginal:  atrophic mucosal changes are present;  Cervix:  normal appearance.  Uterus:  normal size, shape and consistency.  Adnexa:  normal bimanual exam of the adnexa.  Pap smear done and specimen sent using Thin-Prep technique    Data Review:  The following data was reviewed by: Odalys Agudelo MD on 2022:     Labs:    Imaging:  Mammo Screening Digital Tomosynthesis " Bilateral With CAD (05/19/2021 09:38)    Medical Records:  None    Assessment and Plan   Problem List Items Addressed This Visit        Genitourinary and Reproductive     Dyspareunia, female  I have instructed the patient in the use of water-based or silicone based lubricants with intercourse.  We will also plan a trial with Vagifem as noted.  Instructions and precautions have been given.  Patient is to call if no improvement in her symptoms as discussed.       Other    Herpes simplex vulvovaginitis  Patient is to continue her Valtrex as noted.    Relevant Medications    valACYclovir (VALTREX) 500 MG tablet      Other Visit Diagnoses     Encounter for gynecological examination (general) (routine) without abnormal findings    -  Primary  Pap was done today.  If she does not receive the results of the Pap within 2 weeks  time, she was instructed to call to find out the results.  I explained to Brea that the recommendations for Pap smear interval in a low risk patient has lengthened to 3 years time if cytology alone normal or  5 years time if both cytology and HPV testing were normal.  I encouraged her to be seen yearly for a full physical exam including breast and pelvic exam even during the off years when PAP's will not be performed.    Relevant Orders    Pap IG, Rfx HPV ASCU    Encounter for screening mammogram for breast cancer      It is recommended per ACOG, for women at average risk to start annual mammogram screening at the age of 40 until the age of 75 and an individualized decision be made for women after age 75.  She was encouraged to continue getting yearly mammograms.  She should report any palpable breast lump(s) or skin changes regardless of mammographic findings.  I explained to Brea that notification regarding her mammogram results will come from the center performing the study.  Our office will not be routinely calling with mammogram results.  It is her responsibility to make sure that the results  from the mammogram are communicated to her by the breast center.  If she has any questions about the results, she is welcome to call our office anytime.  The patient reports she will schedule her mammogram.    Brea was counseled regarding having clinical breast exams and breast self-awareness.  Women aged 29-39 years of age should have clinical breast exams every 1-3 years and yearly aged 40 and older.  The patient was counseled regarding breast self-awareness focusing on having a sense of what is normal for her breasts so that she can tell if there are changes.  Even small changes should be reported to provider.    Relevant Orders    Mammo Screening Digital Tomosynthesis Bilateral With CAD    Postmenopausal atrophic vaginitis      I have discussed with the patient various treatment options for her vaginal atrophy.  The patient's insurance is no longer covering her Osphena.  Plan a trial with Vagifem as noted.  Instructions and precautions are given.  Patient is to call if no improvement and/or changes in her symptoms in 4 to 6 weeks.    Relevant Medications    estradiol (VAGIFEM) 10 MCG tablet vaginal tablet (Start on 2/14/2022)          Follow Up/Instructions:  Follow up as noted.  Patient was given instructions and counseling regarding her condition or for health maintenance advice. Please see specific information pulled into the AVS if appropriate.     Note: Speech recognition transcription software may have been used to dictate portions of this document.  An attempt at proofreading has been made though minor errors in transcription may still be present.    This note was electronically signed.  Odalys Agudelo M.D.

## 2022-02-23 DIAGNOSIS — Z01.419 ENCOUNTER FOR GYNECOLOGICAL EXAMINATION (GENERAL) (ROUTINE) WITHOUT ABNORMAL FINDINGS: ICD-10-CM

## 2022-03-18 ENCOUNTER — TRANSCRIBE ORDERS (OUTPATIENT)
Dept: LAB | Facility: HOSPITAL | Age: 67
End: 2022-03-18

## 2022-03-18 ENCOUNTER — LAB (OUTPATIENT)
Dept: LAB | Facility: HOSPITAL | Age: 67
End: 2022-03-18

## 2022-03-18 DIAGNOSIS — Z00.00 ROUTINE GENERAL MEDICAL EXAMINATION AT A HEALTH CARE FACILITY: Primary | ICD-10-CM

## 2022-03-18 DIAGNOSIS — Z00.00 ROUTINE GENERAL MEDICAL EXAMINATION AT A HEALTH CARE FACILITY: ICD-10-CM

## 2022-03-18 DIAGNOSIS — E55.9 VITAMIN D DEFICIENCY: ICD-10-CM

## 2022-03-18 PROCEDURE — 80053 COMPREHEN METABOLIC PANEL: CPT

## 2022-03-18 PROCEDURE — 82306 VITAMIN D 25 HYDROXY: CPT

## 2022-03-18 PROCEDURE — 36415 COLL VENOUS BLD VENIPUNCTURE: CPT

## 2022-03-19 LAB
25(OH)D3 SERPL-MCNC: 61.9 NG/ML (ref 30–100)
ALBUMIN SERPL-MCNC: 5 G/DL (ref 3.5–5.2)
ALBUMIN/GLOB SERPL: 2 G/DL
ALP SERPL-CCNC: 96 U/L (ref 39–117)
ALT SERPL W P-5'-P-CCNC: 9 U/L (ref 1–33)
ANION GAP SERPL CALCULATED.3IONS-SCNC: 9.7 MMOL/L (ref 5–15)
AST SERPL-CCNC: 21 U/L (ref 1–32)
BILIRUB SERPL-MCNC: 0.4 MG/DL (ref 0–1.2)
BUN SERPL-MCNC: 13 MG/DL (ref 8–23)
BUN/CREAT SERPL: 18.6 (ref 7–25)
CALCIUM SPEC-SCNC: 9.7 MG/DL (ref 8.6–10.5)
CHLORIDE SERPL-SCNC: 100 MMOL/L (ref 98–107)
CO2 SERPL-SCNC: 27.3 MMOL/L (ref 22–29)
CREAT SERPL-MCNC: 0.7 MG/DL (ref 0.57–1)
EGFRCR SERPLBLD CKD-EPI 2021: 96.7 ML/MIN/1.73
GLOBULIN UR ELPH-MCNC: 2.5 GM/DL
GLUCOSE SERPL-MCNC: 84 MG/DL (ref 65–99)
POTASSIUM SERPL-SCNC: 4.4 MMOL/L (ref 3.5–5.2)
PROT SERPL-MCNC: 7.5 G/DL (ref 6–8.5)
SODIUM SERPL-SCNC: 137 MMOL/L (ref 136–145)

## 2022-05-23 ENCOUNTER — HOSPITAL ENCOUNTER (OUTPATIENT)
Dept: MAMMOGRAPHY | Facility: HOSPITAL | Age: 67
Discharge: HOME | End: 2022-05-23
Admitting: OBSTETRICS & GYNECOLOGY

## 2022-05-23 DIAGNOSIS — Z12.31 ENCOUNTER FOR SCREENING MAMMOGRAM FOR BREAST CANCER: ICD-10-CM

## 2022-05-23 PROCEDURE — 77067 SCR MAMMO BI INCL CAD: CPT

## 2022-05-23 PROCEDURE — 77063 BREAST TOMOSYNTHESIS BI: CPT

## 2022-05-31 ENCOUNTER — TELEPHONE (OUTPATIENT)
Dept: OBSTETRICS AND GYNECOLOGY | Facility: CLINIC | Age: 67
End: 2022-05-31

## 2022-06-02 NOTE — TELEPHONE ENCOUNTER
Patient states she wants to have this drawn to know if she has Ovarian cancer, she was told that she could have that lab drawn to check.

## 2022-06-03 DIAGNOSIS — R19.8 ABDOMINAL FULLNESS: Primary | ICD-10-CM

## 2022-06-03 DIAGNOSIS — R19.8 ABDOMINAL FULLNESS: ICD-10-CM

## 2022-06-03 NOTE — TELEPHONE ENCOUNTER
Okay to inform patient that I have placed an order for patient to have a  level.  Need to inform her however this is only produced about 1 week type of ovarian cancer and it does not rule out ovarian cancer completely.

## 2022-06-04 LAB — CANCER AG125 SERPL-ACNC: 22.3 U/ML (ref 0–38.1)

## 2022-08-04 ENCOUNTER — TELEPHONE (OUTPATIENT)
Dept: OBSTETRICS AND GYNECOLOGY | Facility: CLINIC | Age: 67
End: 2022-08-04

## 2022-08-04 NOTE — TELEPHONE ENCOUNTER
----- Message from Odalys Agudelo MD sent at 8/4/2022  7:50 AM EDT -----  Need to inform patient I have reviewed her transvaginal ultrasound.  The patient has multiple small fibroids.  The endometrium is difficult to visualize secondary to the fibroids but appears normal.  Need to see if patient has had any vaginal bleeding.  If so then patient will need further evaluation with endometrial biopsy or D&C.

## 2022-08-05 ENCOUNTER — PREP FOR SURGERY (OUTPATIENT)
Dept: OTHER | Facility: HOSPITAL | Age: 67
End: 2022-08-05

## 2022-08-05 DIAGNOSIS — N95.0 PMB (POSTMENOPAUSAL BLEEDING): Primary | ICD-10-CM

## 2022-08-05 RX ORDER — SODIUM CHLORIDE 0.9 % (FLUSH) 0.9 %
3 SYRINGE (ML) INJECTION EVERY 12 HOURS SCHEDULED
Status: CANCELLED | OUTPATIENT
Start: 2022-08-05

## 2022-08-05 RX ORDER — SODIUM CHLORIDE 0.9 % (FLUSH) 0.9 %
10 SYRINGE (ML) INJECTION AS NEEDED
Status: CANCELLED | OUTPATIENT
Start: 2022-08-05

## 2022-08-08 PROBLEM — N95.0 PMB (POSTMENOPAUSAL BLEEDING): Status: ACTIVE | Noted: 2022-08-08

## 2022-08-09 ENCOUNTER — TELEPHONE (OUTPATIENT)
Dept: PREADMISSION TESTING | Facility: HOSPITAL | Age: 67
End: 2022-08-09

## 2022-08-10 ENCOUNTER — PRE-ADMISSION TESTING (OUTPATIENT)
Dept: PREADMISSION TESTING | Facility: HOSPITAL | Age: 67
End: 2022-08-10

## 2022-08-10 VITALS — WEIGHT: 114 LBS | HEIGHT: 65 IN | BODY MASS INDEX: 18.99 KG/M2

## 2022-08-10 DIAGNOSIS — N95.0 PMB (POSTMENOPAUSAL BLEEDING): ICD-10-CM

## 2022-08-10 LAB
BACTERIA UR QL AUTO: ABNORMAL /HPF
BASOPHILS # BLD AUTO: 0.03 10*3/MM3 (ref 0–0.2)
BASOPHILS NFR BLD AUTO: 0.7 % (ref 0–1.5)
BILIRUB UR QL STRIP: NEGATIVE
CLARITY UR: CLEAR
COLOR UR: YELLOW
DEPRECATED RDW RBC AUTO: 43.8 FL (ref 37–54)
EOSINOPHIL # BLD AUTO: 0.12 10*3/MM3 (ref 0–0.4)
EOSINOPHIL NFR BLD AUTO: 2.6 % (ref 0.3–6.2)
ERYTHROCYTE [DISTWIDTH] IN BLOOD BY AUTOMATED COUNT: 12.6 % (ref 12.3–15.4)
GLUCOSE UR STRIP-MCNC: NEGATIVE MG/DL
HCT VFR BLD AUTO: 39.7 % (ref 34–46.6)
HGB BLD-MCNC: 13 G/DL (ref 12–15.9)
HGB UR QL STRIP.AUTO: NEGATIVE
HYALINE CASTS UR QL AUTO: ABNORMAL /LPF
IMM GRANULOCYTES # BLD AUTO: 0.01 10*3/MM3 (ref 0–0.05)
IMM GRANULOCYTES NFR BLD AUTO: 0.2 % (ref 0–0.5)
KETONES UR QL STRIP: ABNORMAL
LEUKOCYTE ESTERASE UR QL STRIP.AUTO: ABNORMAL
LYMPHOCYTES # BLD AUTO: 1.53 10*3/MM3 (ref 0.7–3.1)
LYMPHOCYTES NFR BLD AUTO: 33.6 % (ref 19.6–45.3)
MCH RBC QN AUTO: 30.8 PG (ref 26.6–33)
MCHC RBC AUTO-ENTMCNC: 32.7 G/DL (ref 31.5–35.7)
MCV RBC AUTO: 94.1 FL (ref 79–97)
MONOCYTES # BLD AUTO: 0.35 10*3/MM3 (ref 0.1–0.9)
MONOCYTES NFR BLD AUTO: 7.7 % (ref 5–12)
NEUTROPHILS NFR BLD AUTO: 2.51 10*3/MM3 (ref 1.7–7)
NEUTROPHILS NFR BLD AUTO: 55.2 % (ref 42.7–76)
NITRITE UR QL STRIP: NEGATIVE
NRBC BLD AUTO-RTO: 0 /100 WBC (ref 0–0.2)
PH UR STRIP.AUTO: 7 [PH] (ref 5–8)
PLATELET # BLD AUTO: 252 10*3/MM3 (ref 140–450)
PMV BLD AUTO: 8.3 FL (ref 6–12)
PROT UR QL STRIP: NEGATIVE
RBC # BLD AUTO: 4.22 10*6/MM3 (ref 3.77–5.28)
RBC # UR STRIP: ABNORMAL /HPF
REF LAB TEST METHOD: ABNORMAL
SP GR UR STRIP: 1.01 (ref 1–1.03)
SQUAMOUS #/AREA URNS HPF: ABNORMAL /HPF
UROBILINOGEN UR QL STRIP: ABNORMAL
WBC # UR STRIP: ABNORMAL /HPF
WBC NRBC COR # BLD: 4.55 10*3/MM3 (ref 3.4–10.8)

## 2022-08-10 PROCEDURE — 87088 URINE BACTERIA CULTURE: CPT

## 2022-08-10 PROCEDURE — 36415 COLL VENOUS BLD VENIPUNCTURE: CPT

## 2022-08-10 PROCEDURE — 85025 COMPLETE CBC W/AUTO DIFF WBC: CPT

## 2022-08-10 PROCEDURE — 87186 SC STD MICRODIL/AGAR DIL: CPT

## 2022-08-10 PROCEDURE — 93005 ELECTROCARDIOGRAM TRACING: CPT

## 2022-08-10 PROCEDURE — 81001 URINALYSIS AUTO W/SCOPE: CPT

## 2022-08-10 PROCEDURE — 87086 URINE CULTURE/COLONY COUNT: CPT

## 2022-08-10 RX ORDER — ERYTHROMYCIN 5 MG/G
1 OINTMENT OPHTHALMIC NIGHTLY
COMMUNITY
End: 2022-08-24

## 2022-08-10 RX ORDER — LAMOTRIGINE 100 MG/1
200 TABLET ORAL DAILY
COMMUNITY

## 2022-08-11 LAB
QT INTERVAL: 370 MS
QTC INTERVAL: 442 MS

## 2022-08-12 ENCOUNTER — ANESTHESIA (OUTPATIENT)
Dept: PERIOP | Facility: HOSPITAL | Age: 67
End: 2022-08-12

## 2022-08-12 ENCOUNTER — HOSPITAL ENCOUNTER (OUTPATIENT)
Facility: HOSPITAL | Age: 67
Setting detail: HOSPITAL OUTPATIENT SURGERY
Discharge: HOME | End: 2022-08-12
Attending: OBSTETRICS & GYNECOLOGY | Admitting: OBSTETRICS & GYNECOLOGY

## 2022-08-12 ENCOUNTER — ANESTHESIA EVENT (OUTPATIENT)
Dept: PERIOP | Facility: HOSPITAL | Age: 67
End: 2022-08-12

## 2022-08-12 VITALS
RESPIRATION RATE: 20 BRPM | HEART RATE: 78 BPM | DIASTOLIC BLOOD PRESSURE: 80 MMHG | SYSTOLIC BLOOD PRESSURE: 154 MMHG | OXYGEN SATURATION: 96 % | TEMPERATURE: 97.6 F

## 2022-08-12 DIAGNOSIS — N95.0 PMB (POSTMENOPAUSAL BLEEDING): ICD-10-CM

## 2022-08-12 LAB — BACTERIA SPEC AEROBE CULT: ABNORMAL

## 2022-08-12 PROCEDURE — 25010000002 PROPOFOL 10 MG/ML EMULSION: Performed by: NURSE ANESTHETIST, CERTIFIED REGISTERED

## 2022-08-12 PROCEDURE — 25010000002 KETOROLAC TROMETHAMINE PER 15 MG: Performed by: NURSE ANESTHETIST, CERTIFIED REGISTERED

## 2022-08-12 PROCEDURE — 0 LIDOCAINE 1 % SOLUTION: Performed by: OBSTETRICS & GYNECOLOGY

## 2022-08-12 PROCEDURE — 25010000002 ONDANSETRON PER 1 MG: Performed by: NURSE ANESTHETIST, CERTIFIED REGISTERED

## 2022-08-12 PROCEDURE — 58558 HYSTEROSCOPY BIOPSY: CPT | Performed by: OBSTETRICS & GYNECOLOGY

## 2022-08-12 PROCEDURE — 25010000002 HYDROMORPHONE PER 4 MG: Performed by: NURSE ANESTHETIST, CERTIFIED REGISTERED

## 2022-08-12 PROCEDURE — 25010000002 DEXAMETHASONE PER 1 MG: Performed by: NURSE ANESTHETIST, CERTIFIED REGISTERED

## 2022-08-12 PROCEDURE — S0260 H&P FOR SURGERY: HCPCS | Performed by: OBSTETRICS & GYNECOLOGY

## 2022-08-12 PROCEDURE — 25010000002 MIDAZOLAM PER 1MG: Performed by: NURSE ANESTHETIST, CERTIFIED REGISTERED

## 2022-08-12 RX ORDER — PROMETHAZINE HYDROCHLORIDE 25 MG/1
12.5 TABLET ORAL ONCE AS NEEDED
Status: DISCONTINUED | OUTPATIENT
Start: 2022-08-12 | End: 2022-08-12 | Stop reason: HOSPADM

## 2022-08-12 RX ORDER — SODIUM CHLORIDE 0.9 % (FLUSH) 0.9 %
10 SYRINGE (ML) INJECTION AS NEEDED
Status: DISCONTINUED | OUTPATIENT
Start: 2022-08-12 | End: 2022-08-12 | Stop reason: HOSPADM

## 2022-08-12 RX ORDER — DEXAMETHASONE SODIUM PHOSPHATE 4 MG/ML
INJECTION, SOLUTION INTRA-ARTICULAR; INTRALESIONAL; INTRAMUSCULAR; INTRAVENOUS; SOFT TISSUE AS NEEDED
Status: DISCONTINUED | OUTPATIENT
Start: 2022-08-12 | End: 2022-08-12 | Stop reason: SURG

## 2022-08-12 RX ORDER — PROPOFOL 10 MG/ML
VIAL (ML) INTRAVENOUS AS NEEDED
Status: DISCONTINUED | OUTPATIENT
Start: 2022-08-12 | End: 2022-08-12 | Stop reason: SURG

## 2022-08-12 RX ORDER — SULFAMETHOXAZOLE AND TRIMETHOPRIM 800; 160 MG/1; MG/1
1 TABLET ORAL 2 TIMES DAILY
Qty: 14 TABLET | Refills: 0 | Status: SHIPPED | OUTPATIENT
Start: 2022-08-12 | End: 2022-08-19

## 2022-08-12 RX ORDER — IBUPROFEN 600 MG/1
600 TABLET ORAL EVERY 6 HOURS PRN
Qty: 60 TABLET | Refills: 0 | Status: SHIPPED | OUTPATIENT
Start: 2022-08-12

## 2022-08-12 RX ORDER — KETOROLAC TROMETHAMINE 30 MG/ML
INJECTION, SOLUTION INTRAMUSCULAR; INTRAVENOUS AS NEEDED
Status: DISCONTINUED | OUTPATIENT
Start: 2022-08-12 | End: 2022-08-12 | Stop reason: SURG

## 2022-08-12 RX ORDER — MAGNESIUM HYDROXIDE 1200 MG/15ML
LIQUID ORAL AS NEEDED
Status: DISCONTINUED | OUTPATIENT
Start: 2022-08-12 | End: 2022-08-12 | Stop reason: HOSPADM

## 2022-08-12 RX ORDER — HYDROMORPHONE HCL 110MG/55ML
PATIENT CONTROLLED ANALGESIA SYRINGE INTRAVENOUS AS NEEDED
Status: DISCONTINUED | OUTPATIENT
Start: 2022-08-12 | End: 2022-08-12 | Stop reason: SURG

## 2022-08-12 RX ORDER — LIDOCAINE HYDROCHLORIDE 10 MG/ML
INJECTION, SOLUTION INFILTRATION; PERINEURAL AS NEEDED
Status: DISCONTINUED | OUTPATIENT
Start: 2022-08-12 | End: 2022-08-12 | Stop reason: HOSPADM

## 2022-08-12 RX ORDER — HYDROCODONE BITARTRATE AND ACETAMINOPHEN 5; 325 MG/1; MG/1
1 TABLET ORAL EVERY 6 HOURS PRN
Qty: 12 TABLET | Refills: 0 | Status: SHIPPED | OUTPATIENT
Start: 2022-08-12 | End: 2022-08-24

## 2022-08-12 RX ORDER — ONDANSETRON HYDROCHLORIDE 8 MG/1
8 TABLET, FILM COATED ORAL EVERY 8 HOURS PRN
Qty: 15 TABLET | Refills: 0 | Status: SHIPPED | OUTPATIENT
Start: 2022-08-12

## 2022-08-12 RX ORDER — LIDOCAINE HYDROCHLORIDE 20 MG/ML
INJECTION, SOLUTION INTRAVENOUS AS NEEDED
Status: DISCONTINUED | OUTPATIENT
Start: 2022-08-12 | End: 2022-08-12 | Stop reason: SURG

## 2022-08-12 RX ORDER — ONDANSETRON 2 MG/ML
INJECTION INTRAMUSCULAR; INTRAVENOUS AS NEEDED
Status: DISCONTINUED | OUTPATIENT
Start: 2022-08-12 | End: 2022-08-12 | Stop reason: SURG

## 2022-08-12 RX ORDER — MIDAZOLAM HYDROCHLORIDE 2 MG/2ML
INJECTION, SOLUTION INTRAMUSCULAR; INTRAVENOUS AS NEEDED
Status: DISCONTINUED | OUTPATIENT
Start: 2022-08-12 | End: 2022-08-12 | Stop reason: SURG

## 2022-08-12 RX ORDER — ONDANSETRON 2 MG/ML
4 INJECTION INTRAMUSCULAR; INTRAVENOUS ONCE AS NEEDED
Status: DISCONTINUED | OUTPATIENT
Start: 2022-08-12 | End: 2022-08-12 | Stop reason: HOSPADM

## 2022-08-12 RX ORDER — SODIUM CHLORIDE, SODIUM LACTATE, POTASSIUM CHLORIDE, CALCIUM CHLORIDE 600; 310; 30; 20 MG/100ML; MG/100ML; MG/100ML; MG/100ML
1000 INJECTION, SOLUTION INTRAVENOUS CONTINUOUS
Status: DISCONTINUED | OUTPATIENT
Start: 2022-08-12 | End: 2022-08-12 | Stop reason: HOSPADM

## 2022-08-12 RX ORDER — ONDANSETRON 4 MG/1
4 TABLET, FILM COATED ORAL ONCE AS NEEDED
Status: DISCONTINUED | OUTPATIENT
Start: 2022-08-12 | End: 2022-08-12 | Stop reason: HOSPADM

## 2022-08-12 RX ORDER — SODIUM CHLORIDE 0.9 % (FLUSH) 0.9 %
3 SYRINGE (ML) INJECTION EVERY 12 HOURS SCHEDULED
Status: DISCONTINUED | OUTPATIENT
Start: 2022-08-12 | End: 2022-08-12 | Stop reason: HOSPADM

## 2022-08-12 RX ADMIN — DEXAMETHASONE SODIUM PHOSPHATE 4 MG: 4 INJECTION, SOLUTION INTRAMUSCULAR; INTRAVENOUS at 07:57

## 2022-08-12 RX ADMIN — SODIUM CHLORIDE, POTASSIUM CHLORIDE, SODIUM LACTATE AND CALCIUM CHLORIDE: 600; 310; 30; 20 INJECTION, SOLUTION INTRAVENOUS at 08:27

## 2022-08-12 RX ADMIN — HYDROMORPHONE HYDROCHLORIDE 0.5 MG: 2 INJECTION, SOLUTION INTRAMUSCULAR; INTRAVENOUS; SUBCUTANEOUS at 07:57

## 2022-08-12 RX ADMIN — KETOROLAC TROMETHAMINE 15 MG: 30 INJECTION, SOLUTION INTRAMUSCULAR at 07:57

## 2022-08-12 RX ADMIN — MIDAZOLAM HYDROCHLORIDE 2 MG: 1 INJECTION, SOLUTION INTRAMUSCULAR; INTRAVENOUS at 07:52

## 2022-08-12 RX ADMIN — ONDANSETRON 4 MG: 2 INJECTION INTRAMUSCULAR; INTRAVENOUS at 07:57

## 2022-08-12 RX ADMIN — LIDOCAINE HYDROCHLORIDE 50 MG: 20 INJECTION, SOLUTION INTRAVENOUS at 07:55

## 2022-08-12 RX ADMIN — PROPOFOL 150 MCG/KG/MIN: 10 INJECTION, EMULSION INTRAVENOUS at 07:55

## 2022-08-12 RX ADMIN — SODIUM CHLORIDE, POTASSIUM CHLORIDE, SODIUM LACTATE AND CALCIUM CHLORIDE 1000 ML: 600; 310; 30; 20 INJECTION, SOLUTION INTRAVENOUS at 07:30

## 2022-08-12 RX ADMIN — PROPOFOL 100 MG: 10 INJECTION, EMULSION INTRAVENOUS at 07:55

## 2022-08-12 NOTE — OP NOTE
Devyn Perez  : 1955  MRN: [id number]  [facility]: [id number]  Date: 2022    Operative Report    Pre-op Diagnosis:  PMB (postmenopausal bleeding) [N95.0]   Post-op Diagnosis:  Post-Op Diagnosis Codes:     * PMB (postmenopausal bleeding) [N95.0]   Procedure: Procedure(s):  DILATATION AND CURETTAGE HYSTEROSCOPY   Surgeon: Odalys Agudelo M.D.   Assist: Staff was responsible for performing the following activities: Retraction and their skilled assistance was necessary for the success of this case.   Anesthesia: IV sedation with 1% lidocaine paracervical block   Estimated Blood Loss: 5 mls   ABx: none   Specimens:   Endometrial   Findings:  Markedly atrophic vagina.  Positive urethral caruncle.  Atrophic appearing endometrium.  Curettings consistent with leiomyoma.  No visible lesions seen.   Complications: none   Indications:   See H&P         Description of Procedure:  After the appropriate time out and adequate dosing of her anesthesia, the patient had been prepped and draped in the usual sterile fashion.  She was placed in the dorsal lithotomy position using Isacc stirrups.  The bladder had been drained with a red rubber catheter per nursing.  A weighted speculum was placed in the vagina.  The anterior lip of the cervix was grasped with a single-tooth tenaculum.  The cervix was injected at the 3 o'clock and 9 o'clock position with 1% lidocaine plain without any complications.  The cervix was then progressively dilated using Bean dilators.  Rigid hysteroscopy was then performed with the above findings noted.  Sharp curettings were then obtained with a good cry throughout with tissue retrieved and sent for pathologic specimen.  The cervical tenaculum was removed and the cervix was noted to be hemostatic after the application of 2-0 chromic suture in a figure-of-eight fashion.  All instrument and sponge counts were correct at the end of the procedure.  The patient tolerated the procedure well.   There were no complications.  She was taken to the postoperative recovery room in stable condition.    Odalys Agudelo M.D.  8/12/2022  08:20 EDT

## 2022-08-12 NOTE — H&P
Devyn Perez  : 1955  MRN: [id number]  [facility]: [id number]    History and Physical  Subjective   Brea Perez is a 66 y.o. year old  who presents for surgery due to postmenopausal bleeding.  Patient also had complaints of bloating as well as pressure.  She had a transvaginal ultrasound.  The uterus was noted to have multiple intramural, subserosal, and possible submucosal fibroids.  The endometrium was difficult to visualize secondary to the fibroids but appeared to be 3.6 mm.  Patient is here for further evaluation with D&C and diagnostic hysteroscopy.    History  Past Medical History:   Diagnosis Date   • Abnormal Pap smear of cervix    • Anxiety    • Chronic constipation    • COVID-19 vaccine series completed    • Depression    • Fibroid, uterine    • H/O mammogram 2015   • Hearing loss     Reported mild and that she does not have hearing aids at present time   • Herpes 10/30/2017   • History of colon polyps    • History of endometrial biopsy 2012    POST MENOPAUSAL BLEEDING WITH UTERINE LEIOMOMA   • History of Papanicolaou smear of cervix 2015    NORMAL    • Post-menopausal atrophic vaginitis    • Recurrent urinary tract infection    • Seasonal allergies    • Varicella    • Wears glasses     Rx glasses for reading and watching TV PRN     No current facility-administered medications on file prior to encounter.     Current Outpatient Medications on File Prior to Encounter   Medication Sig Dispense Refill   • amitriptyline (ELAVIL) 50 MG tablet Take 50 mg by mouth Every Night.     • Ascorbic Acid (Vitamin C) 500 MG capsule      • busPIRone (BUSPAR) 10 MG tablet Take 1 tablet by mouth 2 (Two) Times a Day. 60 tablet 5   • Calcium Carb-Cholecalciferol (CALCIUM 500 + D3 PO)      • estradiol (VAGIFEM) 10 MCG tablet vaginal tablet Insert 1 tablet into the vagina 2 (Two) Times a Week. 8 tablet 11   • Multiple Vitamins-Minerals (CENTRUM SILVER ADULT 50+ PO)  Take 1 tablet by mouth Daily.     • Multiple Vitamins-Minerals (OCUVITE ADULT 50+ PO) Take 1 tablet by mouth Daily.     • Omega-3 350 MG capsule delayed-release Take 350 mg by mouth Daily. With 1200mg fish oil - combination OTC supplement     • valACYclovir (VALTREX) 500 MG tablet Take 500 mg by mouth 2 (Two) Times a Day As Needed.       No Known Allergies  Past Surgical History:   Procedure Laterality Date   • COLON SURGERY      Scar tissue removed secondary to colon surgery that was done in    • COLONOSCOPY     • COLONOSCOPY N/A 2019    Procedure: COLONOSCOPY;  Surgeon: Jesus Manuel Martínez MD;  Location: [facility] ENDOSCOPY;  Service: Gastroenterology   • DILATATION AND CURETTAGE     • LAPAROSCOPIC CHOLECYSTECTOMY     • LASIK     • SMALL INTESTINE SURGERY      SBO w/ resection & reanastamosis   • TONSILLECTOMY     • TUBAL ABDOMINAL LIGATION     • VAGINAL DELIVERY      x2   • WISDOM TOOTH EXTRACTION       Family History   Problem Relation Age of Onset   • Arthritis Mother    • Hyperlipidemia Mother    • Dementia Mother    • Hypertension Mother    • Thyroid disease Mother    • Cancer Mother         skin   • Hypertension Father    • Alzheimer's disease Father    • Cancer Father         skin   • Melanoma Father    • Prostate cancer Father    • No Known Problems Sister    • No Known Problems Brother    • Cancer Maternal Uncle         lung   • Cancer Maternal Grandmother         colon   • Colon cancer Maternal Grandmother    • COPD Maternal Grandfather    • Cerebral aneurysm Paternal Grandmother    • Alcohol abuse Paternal Grandfather    • COPD Paternal Grandfather    • Breast cancer Neg Hx    • Ovarian cancer Neg Hx      Social History     Socioeconomic History   • Marital status:    Tobacco Use   • Smoking status: Former Smoker     Packs/day: 0.25     Years: 5.00     Pack years: 1.25     Types: Cigarettes     Quit date: 1981     Years since quittin.6   • Smokeless tobacco: Never  Used   Vaping Use   • Vaping Use: Never used   Substance and Sexual Activity   • Alcohol use: Yes     Alcohol/week: 20.0 standard drinks     Types: 20 Cans of beer per week     Comment: Patient denies history of etoh abuse   • Drug use: No   • Sexual activity: Yes     Partners: Male     Birth control/protection: Surgical     Review of Systems  The following systems were reviewed and negative:  constitution, eyes, ENT, respiratory, cardiovascular, gastrointestinal, genitourinary, integument, breast, hematologic / lymphatic, musculoskeletal, neurological, behavioral/psych, endocrine and allergies / immunologic     Objective  Recent Vitals       2020 2/10/2021 2022       BP: 110/70 120/70 120/60     Weight: 47.2 kg (104 lb) 52.6 kg (116 lb) 57.6 kg (127 lb)     BMI (Calculated): 17.3 19.3 19.9         Physical Exam:  General Appearance: alert, appears stated age and cooperative  Head: normocephalic, without obvious abnormality and atraumatic  Eyes: lids and lashes normal, conjunctivae and sclerae normal, no icterus, no pallor and corneas clear  Lungs: clear to auscultation, respirations regular, respirations even and respirations unlabored  Heart: regular rhythm and normal rate, normal S1, S2, no murmur, gallop, or rubs and no click  Abdomen: normal bowel sounds, no masses, no hepatomegaly, no splenomegaly, soft non-tender, no guarding and no rebound tenderness  Extremities: moves extremities well, no edema, no cyanosis and no redness  Skin: no bleeding, bruising or rash and no lesions noted  Psych: normal mood and affect, oriented to person, time and place, thought content organized and appropriate judgment    Recent Labs  Lab Results (last 7 days)     ** No results found for the last 168 hours. **           Recent Imaging  US Non-ob Transvaginal    Result Date: 8/3/2022  Brea Perez : 1955 MRN: [id number] Date: 8/3/2022 Reason for exam/History:  PMB, bloating The ultrasound images are  reviewed.  The uterus is retroverted in position.  The uterus appears abnormal.  There are multiple intramural, subserosal, and submucosal fibroids seen.  The largest is measuring 3.2 cm.  Some fibroids are calcified as well.  The endometrium is difficult to visualize secondary to the fibroids.  The endometrium measures 3.6 mms.  The bilateral ovaries are abnormal in appearance.  The right ovary appears normal.  There is a small 6.7 mm simple appearing cyst on the left ovary.  There is normal vascular flow noted.  There is no free fluid noted. The exam limitations noted:  none See ultrasound report for measurements and structures identified. Odalys Agudelo MD, [facility] [place]           Assessment   1. Postmenopausal bleeding  2. Intramural, subserosal, and probable submucosal fibroids  3. Distortion of endometrium on ultrasound     Plan   1. D&C with diagnostic hysteroscopy.  2. ABx and DVT prophylaxis as indicated.  3. Risks, complications, benefits, and other alternatives discussed.  4. All questions answered and pt in agreement with plan.    Odalys Agudelo M.D.  8/12/2022  06:03 EDT

## 2022-08-12 NOTE — ANESTHESIA POSTPROCEDURE EVALUATION
Patient: Brea Perez    Procedure Summary     Date: 08/12/22 Room / Location: [facility] OR  /  [facility]    Anesthesia Start: 0748 Anesthesia Stop: 0827    Procedure: DILATATION AND CURETTAGE HYSTEROSCOPY (N/A Uterus) Diagnosis:       PMB (postmenopausal bleeding)      (PMB (postmenopausal bleeding) [N95.0])    Surgeons: Odalys Agudelo MD Provider: Indira Jang CRNA    Anesthesia Type: MAC ASA Status: 2          Anesthesia Type: MAC    Vitals  Vitals Value Taken Time   BP 96/59 08/12/22 0828   Temp 97.6 °F (36.4 °C) 08/12/22 0828   Pulse 81 08/12/22 0828   Resp 16 08/12/22 0828   SpO2 98 % 08/12/22 0828           Post Anesthesia Care and Evaluation    Patient location during evaluation: PHASE II  Patient participation: complete - patient participated  Level of consciousness: awake and alert  Pain score: 0  Pain management: satisfactory to patient    Airway patency: patent  Anesthetic complications: No anesthetic complications  PONV Status: none  Cardiovascular status: acceptable and stable  Respiratory status: acceptable  Hydration status: acceptable    Comments: Vitals signs as noted in nursing documentation as per protocol.

## 2022-08-12 NOTE — ANESTHESIA PREPROCEDURE EVALUATION
Anesthesia Evaluation     Patient summary reviewed and Nursing notes reviewed   NPO Solid Status: > 8 hours  NPO Liquid Status: > 8 hours           Airway   Mallampati: II  TM distance: >3 FB  Neck ROM: full  Possible difficult intubation  Dental - normal exam     Pulmonary - normal exam   (+) a smoker Former,   Cardiovascular - normal exam    ECG reviewed    (+) hypertension well controlled less than 2 medications,       Neuro/Psych  (+) psychiatric history Anxiety and Depression,    GI/Hepatic/Renal/Endo - negative ROS     Musculoskeletal (-) negative ROS    Abdominal  - normal exam   Substance History   (+) alcohol use,   (-) drug use     OB/GYN          Other                          Anesthesia Plan    ASA 2     MAC     (Risks and benefits discussed including risk of aspiration, recall and dental damage. All patient questions answered. Will continue with POC.)  intravenous induction     Anesthetic plan, risks, benefits, and alternatives have been provided, discussed and informed consent has been obtained with: patient.  Pre-procedure education provided  Plan discussed with CRNA.

## 2022-08-15 ENCOUNTER — TRANSCRIBE ORDERS (OUTPATIENT)
Dept: ADMINISTRATIVE | Facility: HOSPITAL | Age: 67
End: 2022-08-15

## 2022-08-15 ENCOUNTER — LAB (OUTPATIENT)
Dept: LAB | Facility: HOSPITAL | Age: 67
End: 2022-08-15

## 2022-08-15 ENCOUNTER — TRANSCRIBE ORDERS (OUTPATIENT)
Dept: LAB | Facility: HOSPITAL | Age: 67
End: 2022-08-15

## 2022-08-15 DIAGNOSIS — R94.6 NONSPECIFIC ABNORMAL RESULTS OF THYROID FUNCTION STUDY: ICD-10-CM

## 2022-08-15 DIAGNOSIS — Z00.00 ROUTINE GENERAL MEDICAL EXAMINATION AT A HEALTH CARE FACILITY: Primary | ICD-10-CM

## 2022-08-15 DIAGNOSIS — E55.9 VITAMIN D DEFICIENCY: ICD-10-CM

## 2022-08-15 DIAGNOSIS — R53.83 TIREDNESS: ICD-10-CM

## 2022-08-15 DIAGNOSIS — M85.89 OTHER SPECIFIED DISORDERS OF BONE DENSITY AND STRUCTURE, MULTIPLE SITES: Primary | ICD-10-CM

## 2022-08-15 DIAGNOSIS — Z00.00 ROUTINE GENERAL MEDICAL EXAMINATION AT A HEALTH CARE FACILITY: ICD-10-CM

## 2022-08-15 LAB
25(OH)D3 SERPL-MCNC: 84.7 NG/ML (ref 30–100)
ALBUMIN SERPL-MCNC: 4.7 G/DL (ref 3.5–5.2)
ALBUMIN/GLOB SERPL: 2 G/DL
ALP SERPL-CCNC: 79 U/L (ref 39–117)
ALT SERPL W P-5'-P-CCNC: 10 U/L (ref 1–33)
ANION GAP SERPL CALCULATED.3IONS-SCNC: 12 MMOL/L (ref 5–15)
AST SERPL-CCNC: 23 U/L (ref 1–32)
BILIRUB SERPL-MCNC: 0.3 MG/DL (ref 0–1.2)
BUN SERPL-MCNC: 7 MG/DL (ref 8–23)
BUN/CREAT SERPL: 10 (ref 7–25)
CALCIUM SPEC-SCNC: 9.7 MG/DL (ref 8.6–10.5)
CHLORIDE SERPL-SCNC: 99 MMOL/L (ref 98–107)
CHOLEST SERPL-MCNC: 237 MG/DL (ref 0–200)
CO2 SERPL-SCNC: 27 MMOL/L (ref 22–29)
CREAT SERPL-MCNC: 0.7 MG/DL (ref 0.57–1)
DEPRECATED RDW RBC AUTO: 39.3 FL (ref 37–54)
EGFRCR SERPLBLD CKD-EPI 2021: 96.7 ML/MIN/1.73
ERYTHROCYTE [DISTWIDTH] IN BLOOD BY AUTOMATED COUNT: 11.9 % (ref 12.3–15.4)
GLOBULIN UR ELPH-MCNC: 2.3 GM/DL
GLUCOSE SERPL-MCNC: 93 MG/DL (ref 65–99)
HCT VFR BLD AUTO: 39.1 % (ref 34–46.6)
HDLC SERPL-MCNC: 99 MG/DL (ref 40–60)
HGB BLD-MCNC: 13.4 G/DL (ref 12–15.9)
IRON 24H UR-MRATE: 82 MCG/DL (ref 37–145)
IRON SATN MFR SERPL: 23 % (ref 20–50)
LDLC SERPL CALC-MCNC: 130 MG/DL (ref 0–100)
LDLC/HDLC SERPL: 1.29 {RATIO}
MCH RBC QN AUTO: 31.2 PG (ref 26.6–33)
MCHC RBC AUTO-ENTMCNC: 34.3 G/DL (ref 31.5–35.7)
MCV RBC AUTO: 90.9 FL (ref 79–97)
PLATELET # BLD AUTO: 308 10*3/MM3 (ref 140–450)
PMV BLD AUTO: 9 FL (ref 6–12)
POTASSIUM SERPL-SCNC: 4.9 MMOL/L (ref 3.5–5.2)
PROT SERPL-MCNC: 7 G/DL (ref 6–8.5)
RBC # BLD AUTO: 4.3 10*6/MM3 (ref 3.77–5.28)
REF LAB TEST METHOD: NORMAL
SODIUM SERPL-SCNC: 138 MMOL/L (ref 136–145)
T4 FREE SERPL-MCNC: 0.99 NG/DL (ref 0.93–1.7)
TIBC SERPL-MCNC: 352 MCG/DL (ref 298–536)
TRANSFERRIN SERPL-MCNC: 236 MG/DL (ref 200–360)
TRIGL SERPL-MCNC: 49 MG/DL (ref 0–150)
TSH SERPL DL<=0.05 MIU/L-ACNC: 4.08 UIU/ML (ref 0.27–4.2)
VIT B12 BLD-MCNC: 502 PG/ML (ref 211–946)
VLDLC SERPL-MCNC: 8 MG/DL (ref 5–40)
WBC NRBC COR # BLD: 4.83 10*3/MM3 (ref 3.4–10.8)

## 2022-08-15 PROCEDURE — 84466 ASSAY OF TRANSFERRIN: CPT

## 2022-08-15 PROCEDURE — 80061 LIPID PANEL: CPT

## 2022-08-15 PROCEDURE — 83540 ASSAY OF IRON: CPT

## 2022-08-15 PROCEDURE — 84439 ASSAY OF FREE THYROXINE: CPT

## 2022-08-15 PROCEDURE — 82607 VITAMIN B-12: CPT

## 2022-08-15 PROCEDURE — 82306 VITAMIN D 25 HYDROXY: CPT

## 2022-08-15 PROCEDURE — 80050 GENERAL HEALTH PANEL: CPT

## 2022-08-15 PROCEDURE — 36415 COLL VENOUS BLD VENIPUNCTURE: CPT

## 2022-08-24 ENCOUNTER — OFFICE VISIT (OUTPATIENT)
Dept: OBSTETRICS AND GYNECOLOGY | Facility: CLINIC | Age: 67
End: 2022-08-24

## 2022-08-24 VITALS
SYSTOLIC BLOOD PRESSURE: 128 MMHG | BODY MASS INDEX: 18.99 KG/M2 | HEIGHT: 65 IN | DIASTOLIC BLOOD PRESSURE: 76 MMHG | WEIGHT: 114 LBS

## 2022-08-24 DIAGNOSIS — Z09 POSTOPERATIVE FOLLOW-UP: Primary | ICD-10-CM

## 2022-08-24 PROCEDURE — 99024 POSTOP FOLLOW-UP VISIT: CPT | Performed by: PHYSICIAN ASSISTANT

## 2022-08-24 RX ORDER — LAMOTRIGINE 200 MG/1
1 TABLET, EXTENDED RELEASE ORAL DAILY
COMMUNITY
Start: 2022-06-24

## 2022-08-24 RX ORDER — LAMOTRIGINE 200 MG/1
TABLET ORAL
COMMUNITY
Start: 2022-03-09 | End: 2022-08-24

## 2022-08-24 RX ORDER — ERYTHROMYCIN 5 MG/G
OINTMENT OPHTHALMIC
COMMUNITY

## 2022-08-24 NOTE — PROGRESS NOTES
I have reviewed the notes, assessments, and/or procedures performed by Debi Glass PA-C, I concur with her/his documentation of Brea Perez.

## 2022-08-24 NOTE — PROGRESS NOTES
Subjective   Chief Complaint   Patient presents with   • Post-op     12 days Post -op D&C Hysteroscopy. Doing well       Brea Perez is a 66 y.o. year old  presenting to be seen for post op visit  She is doing well 12 days post procedure  Normal bowel and bladder function  Light vaginal spotting now  Pathology benign surface endometrium and negative for hyperplasia or malignancy       Past Medical History:   Diagnosis Date   • Abnormal Pap smear of cervix    • Anxiety    • Chronic constipation    • COVID-19 vaccine series completed    • Depression    • Fibroid, uterine    • H/O mammogram 2015   • Hearing loss     Reported mild and that she does not have hearing aids at present time   • Herpes 10/30/2017   • History of colon polyps    • History of endometrial biopsy 2012    POST MENOPAUSAL BLEEDING WITH UTERINE LEIOMOMA   • History of Papanicolaou smear of cervix 2015    NORMAL    • Post-menopausal atrophic vaginitis    • Recurrent urinary tract infection    • Seasonal allergies    • Varicella    • Wears glasses     Rx glasses for reading and watching TV PRN        Current Outpatient Medications:   •  amitriptyline (ELAVIL) 50 MG tablet, Take 50 mg by mouth Every Night., Disp: , Rfl:   •  Ascorbic Acid (Vitamin C) 500 MG capsule, , Disp: , Rfl:   •  betamethasone valerate (VALISONE) 0.1 % cream, Apply 1 application topically to the appropriate area as directed 1 (One) Time Per Week., Disp: , Rfl:   •  busPIRone (BUSPAR) 10 MG tablet, Take 1 tablet by mouth 2 (Two) Times a Day., Disp: 60 tablet, Rfl: 5  •  Calcium Carb-Cholecalciferol (CALCIUM 500 + D3 PO), , Disp: , Rfl:   •  erythromycin (ROMYCIN) 5 MG/GM ophthalmic ointment, erythromycin 5 mg/gram (0.5 %) eye ointment  APPLY 1 CM RIBBON INTO THE LOWER CONJUNCTIVAL SAC(S) IN THE AFFECTED EYE(S) BY OPHTHALMIC ROUTE 3 TIMES PER DAY, Disp: , Rfl:   •  estradiol (VAGIFEM) 10 MCG tablet vaginal tablet, Insert 1 tablet into the  vagina 2 (Two) Times a Week., Disp: 8 tablet, Rfl: 11  •  ibuprofen (ADVIL,MOTRIN) 600 MG tablet, Take 1 tablet by mouth Every 6 (Six) Hours As Needed for Mild Pain ., Disp: 60 tablet, Rfl: 0  •  lamoTRIgine (LaMICtal) 100 MG tablet, Take 200 mg by mouth Daily., Disp: , Rfl:   •  lamoTRIgine  MG tablet sustained-release 24 hour, Take 1 tablet by mouth Daily., Disp: , Rfl:   •  Multiple Vitamins-Minerals (CENTRUM SILVER ADULT 50+ PO), Take 1 tablet by mouth Daily., Disp: , Rfl:   •  Multiple Vitamins-Minerals (OCUVITE ADULT 50+ PO), Take 1 tablet by mouth Daily., Disp: , Rfl:   •  mupirocin (BACTROBAN) 2 % ointment, Apply 1 application topically to the appropriate area as directed 3 (Three) Times a Day As Needed (cold sores)., Disp: , Rfl:   •  Omega-3 350 MG capsule delayed-release, Take 350 mg by mouth Daily. With 1200mg fish oil - combination OTC supplement, Disp: , Rfl:   •  ondansetron (ZOFRAN) 8 MG tablet, Take 1 tablet by mouth Every 8 (Eight) Hours As Needed for Nausea or Vomiting., Disp: 15 tablet, Rfl: 0  •  valACYclovir (VALTREX) 500 MG tablet, Take 500 mg by mouth 2 (Two) Times a Day As Needed., Disp: , Rfl:    No Known Allergies   Past Surgical History:   Procedure Laterality Date   • COLON SURGERY  2011    Scar tissue removed secondary to colon surgery that was done in 2003   • COLONOSCOPY     • COLONOSCOPY N/A 08/13/2019    Procedure: COLONOSCOPY;  Surgeon: Jesus Manuel Martínez MD;  Location: [facility] ENDOSCOPY;  Service: Gastroenterology   • D & C HYSTEROSCOPY N/A 8/12/2022    Procedure: DILATATION AND CURETTAGE HYSTEROSCOPY;  Surgeon: Odalys Agudelo MD;  Location: [facility] OR;  Service: Obstetrics/Gynecology;  Laterality: N/A;   • DILATATION AND CURETTAGE     • LAPAROSCOPIC CHOLECYSTECTOMY  2008   • LASIK  2008   • SMALL INTESTINE SURGERY  2003    SBO w/ resection & reanastamosis   • TONSILLECTOMY     • TUBAL ABDOMINAL LIGATION     • VAGINAL DELIVERY      x2   • WISDOM TOOTH EXTRACTION  1971      Social  "History     Socioeconomic History   • Marital status:    Tobacco Use   • Smoking status: Former Smoker     Packs/day: 0.25     Years: 5.00     Pack years: 1.25     Types: Cigarettes     Quit date: 1981     Years since quittin.6   • Smokeless tobacco: Never Used   Vaping Use   • Vaping Use: Never used   Substance and Sexual Activity   • Alcohol use: Yes     Alcohol/week: 20.0 standard drinks     Types: 20 Cans of beer per week     Comment: Patient denies history of etoh abuse   • Drug use: No   • Sexual activity: Yes     Partners: Male     Birth control/protection: Surgical      Family History   Problem Relation Age of Onset   • Arthritis Mother    • Hyperlipidemia Mother    • Dementia Mother    • Hypertension Mother    • Thyroid disease Mother    • Cancer Mother         skin   • Hypertension Father    • Alzheimer's disease Father    • Cancer Father         skin   • Melanoma Father    • Prostate cancer Father    • No Known Problems Sister    • No Known Problems Brother    • Cancer Maternal Uncle         lung   • Cancer Maternal Grandmother         colon   • Colon cancer Maternal Grandmother    • COPD Maternal Grandfather    • Cerebral aneurysm Paternal Grandmother    • Alcohol abuse Paternal Grandfather    • COPD Paternal Grandfather    • Breast cancer Neg Hx    • Ovarian cancer Neg Hx        Review of Systems   Constitutional: Negative for chills, diaphoresis and fever.   Gastrointestinal: Negative.    Genitourinary: Negative for difficulty urinating and dysuria.           Objective   /76   Ht 165.1 cm (65\")   Wt 51.7 kg (114 lb)   LMP  (LMP Unknown)   BMI 18.97 kg/m²     Physical Exam  Constitutional:       Appearance: Normal appearance. She is well-developed and well-groomed.   Eyes:      General: Lids are normal.      Extraocular Movements: Extraocular movements intact.      Conjunctiva/sclera: Conjunctivae normal.   Skin:     General: Skin is warm and dry.      Findings: No bruising or " lesion.   Neurological:      Mental Status: She is alert.   Psychiatric:         Attention and Perception: Attention normal.         Mood and Affect: Mood normal.         Speech: Speech normal.         Behavior: Behavior is cooperative.            Result Review :                   Assessment and Plan  Diagnoses and all orders for this visit:    1. Postoperative follow-up (Primary)      Patient Instructions   RTO prn             This note was electronically signed.    Michelle Glass PA-C   August 24, 2022

## 2022-08-30 ENCOUNTER — APPOINTMENT (OUTPATIENT)
Dept: BONE DENSITY | Facility: HOSPITAL | Age: 67
End: 2022-08-30

## 2022-08-30 DIAGNOSIS — M85.89 OTHER SPECIFIED DISORDERS OF BONE DENSITY AND STRUCTURE, MULTIPLE SITES: ICD-10-CM

## 2022-08-30 PROCEDURE — 77080 DXA BONE DENSITY AXIAL: CPT

## 2023-02-15 ENCOUNTER — OFFICE VISIT (OUTPATIENT)
Dept: OBSTETRICS AND GYNECOLOGY | Facility: CLINIC | Age: 68
End: 2023-02-15
Payer: MEDICARE

## 2023-02-15 VITALS
SYSTOLIC BLOOD PRESSURE: 136 MMHG | WEIGHT: 116 LBS | BODY MASS INDEX: 18.21 KG/M2 | HEIGHT: 67 IN | DIASTOLIC BLOOD PRESSURE: 84 MMHG

## 2023-02-15 DIAGNOSIS — Z01.411 ENCOUNTER FOR GYNECOLOGICAL EXAMINATION WITH ABNORMAL FINDING: Primary | ICD-10-CM

## 2023-02-15 DIAGNOSIS — Z12.31 ENCOUNTER FOR SCREENING MAMMOGRAM FOR BREAST CANCER: ICD-10-CM

## 2023-02-15 DIAGNOSIS — M85.852 OSTEOPENIA OF LEFT HIP: ICD-10-CM

## 2023-02-15 DIAGNOSIS — Z12.11 COLON CANCER SCREENING: ICD-10-CM

## 2023-02-15 DIAGNOSIS — N95.2 POSTMENOPAUSAL ATROPHIC VAGINITIS: ICD-10-CM

## 2023-02-15 PROCEDURE — 99397 PER PM REEVAL EST PAT 65+ YR: CPT | Performed by: OBSTETRICS & GYNECOLOGY

## 2023-02-15 PROCEDURE — 3015F CERV CANCER SCREEN DOCD: CPT | Performed by: OBSTETRICS & GYNECOLOGY

## 2023-02-15 PROCEDURE — 99214 OFFICE O/P EST MOD 30 MIN: CPT | Performed by: OBSTETRICS & GYNECOLOGY

## 2023-02-15 RX ORDER — ESTRADIOL 10 UG/1
1 INSERT VAGINAL 2 TIMES WEEKLY
Qty: 8 TABLET | Refills: 11 | Status: SHIPPED | OUTPATIENT
Start: 2023-02-16

## 2023-02-15 NOTE — PROGRESS NOTES
Chief Complaint  Gynecologic Exam     History of Present Illness:  Patient is 67 y.o.  who presents to [facility] here for annual examination.  Patient had her last Pap smear in February of last year.  She does have history of abnormal Pap smears in the past.  Patient has not been using her Vagifem consistently.  She reports only using it occasionally.  She continues to have the vaginal atrophy.  Patient did have her mammogram in May of last year.  She continues to see Dr. Best for primary care.  Patient had a colonoscopy in .  She was told to repeat in 10 years.  Patient also had bone density scan last year.  She did show osteopenia of her hip.  She has been taking calcium and vitamin D supplements.    History  Past Medical History:   Diagnosis Date   • Abnormal Pap smear of cervix    • Anxiety    • Chronic constipation    • COVID-19 vaccine series completed    • Depression    • Fibroid, uterine    • H/O mammogram 2015   • Hearing loss     Reported mild and that she does not have hearing aids at present time   • Herpes 10/30/2017   • History of colon polyps    • History of endometrial biopsy 2012    POST MENOPAUSAL BLEEDING WITH UTERINE LEIOMOMA   • History of Papanicolaou smear of cervix 2015    NORMAL    • Post-menopausal atrophic vaginitis    • Recurrent urinary tract infection    • Seasonal allergies    • Varicella    • Wears glasses     Rx glasses for reading and watching TV PRN     Current Outpatient Medications on File Prior to Visit   Medication Sig Dispense Refill   • amitriptyline (ELAVIL) 50 MG tablet Take 50 mg by mouth Every Night.     • Ascorbic Acid (Vitamin C) 500 MG capsule      • betamethasone valerate (VALISONE) 0.1 % cream Apply 1 application topically to the appropriate area as directed 1 (One) Time Per Week.     • busPIRone (BUSPAR) 10 MG tablet Take 1 tablet by mouth 2 (Two) Times a Day. 60 tablet 5   • Calcium Carb-Cholecalciferol  (CALCIUM 500 + D3 PO)      • erythromycin (ROMYCIN) 5 MG/GM ophthalmic ointment erythromycin 5 mg/gram (0.5 %) eye ointment   APPLY 1 CM RIBBON INTO THE LOWER CONJUNCTIVAL SAC(S) IN THE AFFECTED EYE(S) BY OPHTHALMIC ROUTE 3 TIMES PER DAY     • ibuprofen (ADVIL,MOTRIN) 600 MG tablet Take 1 tablet by mouth Every 6 (Six) Hours As Needed for Mild Pain . 60 tablet 0   • lamoTRIgine (LaMICtal) 100 MG tablet Take 200 mg by mouth Daily.     • lamoTRIgine  MG tablet sustained-release 24 hour Take 1 tablet by mouth Daily.     • Multiple Vitamins-Minerals (CENTRUM SILVER ADULT 50+ PO) Take 1 tablet by mouth Daily.     • Multiple Vitamins-Minerals (OCUVITE ADULT 50+ PO) Take 1 tablet by mouth Daily.     • mupirocin (BACTROBAN) 2 % ointment Apply 1 application topically to the appropriate area as directed 3 (Three) Times a Day As Needed (cold sores).     • Omega-3 350 MG capsule delayed-release Take 350 mg by mouth Daily. With 1200mg fish oil - combination OTC supplement     • ondansetron (ZOFRAN) 8 MG tablet Take 1 tablet by mouth Every 8 (Eight) Hours As Needed for Nausea or Vomiting. 15 tablet 0   • valACYclovir (VALTREX) 500 MG tablet Take 500 mg by mouth 2 (Two) Times a Day As Needed.     • [DISCONTINUED] estradiol (VAGIFEM) 10 MCG tablet vaginal tablet Insert 1 tablet into the vagina 2 (Two) Times a Week. 8 tablet 11     No current facility-administered medications on file prior to visit.     No Known Allergies  Past Surgical History:   Procedure Laterality Date   • COLON SURGERY  2011    Scar tissue removed secondary to colon surgery that was done in 2003   • COLONOSCOPY     • COLONOSCOPY N/A 08/13/2019    Procedure: COLONOSCOPY;  Surgeon: Jesus Manuel Martínez MD;  Location: [facility] ENDOSCOPY;  Service: Gastroenterology   • D & C HYSTEROSCOPY N/A 8/12/2022    Procedure: DILATATION AND CURETTAGE HYSTEROSCOPY;  Surgeon: Odalys Agudelo MD;  Location: [facility] OR;  Service: Obstetrics/Gynecology;  Laterality: N/A;   •  "DILATATION AND CURETTAGE     • LAPAROSCOPIC CHOLECYSTECTOMY     • LASIK     • SMALL INTESTINE SURGERY      SBO w/ resection & reanastamosis   • TONSILLECTOMY     • TUBAL ABDOMINAL LIGATION     • VAGINAL DELIVERY      x2   • WISDOM TOOTH EXTRACTION  1971     Family History   Problem Relation Age of Onset   • Arthritis Mother    • Hyperlipidemia Mother    • Dementia Mother    • Hypertension Mother    • Thyroid disease Mother    • Cancer Mother         skin   • Deep vein thrombosis Mother    • Stroke Mother    • Hypertension Father    • Alzheimer's disease Father    • Cancer Father         skin   • Melanoma Father    • Prostate cancer Father    • No Known Problems Sister    • No Known Problems Brother    • Cancer Maternal Uncle         lung   • Cancer Maternal Grandmother         colon   • Colon cancer Maternal Grandmother    • COPD Maternal Grandfather    • Cerebral aneurysm Paternal Grandmother    • Alcohol abuse Paternal Grandfather    • COPD Paternal Grandfather    • Breast cancer Neg Hx    • Ovarian cancer Neg Hx      Social History     Socioeconomic History   • Marital status:    Tobacco Use   • Smoking status: Former     Packs/day: 0.25     Years: 5.00     Pack years: 1.25     Types: Cigarettes     Quit date: 1981     Years since quittin.1   • Smokeless tobacco: Never   Vaping Use   • Vaping Use: Never used   Substance and Sexual Activity   • Alcohol use: Yes     Alcohol/week: 20.0 standard drinks     Types: 20 Cans of beer per week     Comment: Patient denies history of etoh abuse   • Drug use: No   • Sexual activity: Yes     Partners: Male     Birth control/protection: Surgical       Physical Examination:  Vital Signs: /84   Ht 170.2 cm (67\")   Wt 52.6 kg (116 lb)   BMI 18.17 kg/m²     General Appearance: alert, appears stated age, and cooperative  Breasts:   Symmetric bilaterally.  No palpable masses.  No axillary adenopathy.  Both been normal.  No discharge " noted.  Abdomen: no masses, no hepatomegaly, no splenomegaly, soft non-tender, no guarding, and no rebound tenderness  Pelvic: Clinical staff was present for exam  External genitalia:  normal appearance of the external genitalia including Bartholin's and [place]'s glands.  :  urethral meatus normal;  Vaginal: Atrophic changes noted  Cervix:  normal appearance.  Uterus:  normal size, shape and consistency.  Adnexa:  normal bimanual exam of the adnexa.  Pap smear obtained using ThinPrep technique    Data Review:  The following data was reviewed by: Odalys Agudelo MD on 02/15/2023:     Labs:    Imaging:  DEXA Bone Density Axial (08/30/2022 08:51)  Mammo Screening Digital Tomosynthesis Bilateral With CAD (05/23/2022 09:33)    Medical Records:  None    Assessment and Plan   1. Postmenopausal atrophic vaginitis  Patient with continued atrophic changes.  Prescription is given for Vagifem as noted.  Patient may use twice weekly as discussed.  - estradiol (VAGIFEM) 10 MCG tablet vaginal tablet; Insert 1 tablet into the vagina 2 (Two) Times a Week.  Dispense: 8 tablet; Refill: 11    2. Encounter for gynecological examination with abnormal finding  I explained to Brea that Pap smears are no longer recommended in patients after 65 years of age who have had adequate negative screening with three consecutive negative pap smears within the previous 10 years and the most recent test performed within the past 5 years. Women who have a history of OWEN 2, OWEN 3, or ACIS should continue routine screening for a total of 20 years after regression or treatment.   I stressed to Brea that she still should be seen yearly for a full physical including breast and pelvic exam.  I informed her that women aged 65 and older still do get cervical cancer representing 14.1% of the [place] female population and 19.6% of new cases of cervical cancer.  I also informed the patient regarding medicare guidelines on coverage for pap smear screening.  For this  reason, Brea has elected pap smear screening this year.  - LIQUID-BASED PAP SMEAR, [facility] ([facility])    3. Encounter for screening mammogram for breast cancer  It is recommended per ACOG, for women at average risk to start annual mammogram screening at the age of 40 until the age of 75 and an individualized decision be made for women after age 75.  She was encouraged to continue getting yearly mammograms.  She should report any palpable breast lump(s) or skin changes regardless of mammographic findings.  I explained to Brea that notification regarding her mammogram results will come from the center performing the study.  Our office will not be routinely calling with mammogram results.  It is her responsibility to make sure that the results from the mammogram are communicated to her by the breast center.  If she has any questions about the results, she is welcome to call our office anytime.  The patient reports she will schedule her mammogram.    Brea was counseled regarding having clinical breast exams and breast self-awareness.  Women aged 29-39 years of age should have clinical breast exams every 1-3 years and yearly aged 40 and older.  The patient was counseled regarding breast self-awareness focusing on having a sense of what is normal for her breasts so that she can tell if there are changes.  Even small changes should be reported to provider.  - Mammo Screening Digital Tomosynthesis Bilateral With CAD; Future    4. Colon cancer screening  Patient desires to proceed with Cologuard.  Orders placed as noted.  - Cologuard - Stool, Per Rectum; Future    5. Osteopenia of left hip  Patient with osteopenia of left hip.  She is to continue her calcium and vitamin D supplements.  Patient is to repeat bone density scan in 1 year.    Follow Up/Instructions:  Follow up as noted.  Patient was given instructions and counseling regarding her condition or for health maintenance advice. Please see specific information  pulled into the AVS if appropriate.     Note: Speech recognition transcription software may have been used to dictate portions of this document.  An attempt at proofreading has been made though minor errors in transcription may still be present.    This note was electronically signed.  Odalys Agudelo M.D.

## 2023-02-20 LAB — REF LAB TEST METHOD: NORMAL

## 2023-02-22 ENCOUNTER — TRANSCRIBE ORDERS (OUTPATIENT)
Dept: ADMINISTRATIVE | Facility: HOSPITAL | Age: 68
End: 2023-02-22
Payer: MEDICARE

## 2023-02-22 DIAGNOSIS — Z12.31 SCREENING MAMMOGRAM FOR BREAST CANCER: Primary | ICD-10-CM

## 2023-03-13 ENCOUNTER — TELEPHONE (OUTPATIENT)
Dept: SURGERY | Facility: CLINIC | Age: 68
End: 2023-03-13
Payer: MEDICARE

## 2023-03-13 NOTE — TELEPHONE ENCOUNTER
Patient had a positive cologuard and Dr. Agudelo wants her to schedule a colonoscopy.  Please call at [phone].

## 2023-03-20 ENCOUNTER — LAB (OUTPATIENT)
Dept: LAB | Facility: HOSPITAL | Age: 68
End: 2023-03-20
Payer: MEDICARE

## 2023-03-20 ENCOUNTER — TRANSCRIBE ORDERS (OUTPATIENT)
Dept: LAB | Facility: HOSPITAL | Age: 68
End: 2023-03-20
Payer: MEDICARE

## 2023-03-20 DIAGNOSIS — E55.9 AVITAMINOSIS D: ICD-10-CM

## 2023-03-20 DIAGNOSIS — R53.83 TIREDNESS: ICD-10-CM

## 2023-03-20 DIAGNOSIS — D50.0 IRON DEFICIENCY ANEMIA SECONDARY TO BLOOD LOSS (CHRONIC): ICD-10-CM

## 2023-03-20 DIAGNOSIS — R94.5 NONSPECIFIC ABNORMAL RESULTS OF LIVER FUNCTION STUDY: Primary | ICD-10-CM

## 2023-03-20 DIAGNOSIS — R94.5 NONSPECIFIC ABNORMAL RESULTS OF LIVER FUNCTION STUDY: ICD-10-CM

## 2023-03-20 LAB
DEPRECATED RDW RBC AUTO: 39.5 FL (ref 37–54)
ERYTHROCYTE [DISTWIDTH] IN BLOOD BY AUTOMATED COUNT: 11.7 % (ref 12.3–15.4)
HCT VFR BLD AUTO: 38.7 % (ref 34–46.6)
HGB BLD-MCNC: 13.2 G/DL (ref 12–15.9)
MCH RBC QN AUTO: 31.7 PG (ref 26.6–33)
MCHC RBC AUTO-ENTMCNC: 34.1 G/DL (ref 31.5–35.7)
MCV RBC AUTO: 93 FL (ref 79–97)
PLATELET # BLD AUTO: 276 10*3/MM3 (ref 140–450)
PMV BLD AUTO: 8.9 FL (ref 6–12)
RBC # BLD AUTO: 4.16 10*6/MM3 (ref 3.77–5.28)
WBC NRBC COR # BLD: 6.22 10*3/MM3 (ref 3.4–10.8)

## 2023-03-20 PROCEDURE — 83540 ASSAY OF IRON: CPT

## 2023-03-20 PROCEDURE — 82306 VITAMIN D 25 HYDROXY: CPT

## 2023-03-20 PROCEDURE — 84466 ASSAY OF TRANSFERRIN: CPT

## 2023-03-20 PROCEDURE — 80053 COMPREHEN METABOLIC PANEL: CPT

## 2023-03-20 PROCEDURE — 36415 COLL VENOUS BLD VENIPUNCTURE: CPT

## 2023-03-20 PROCEDURE — 82607 VITAMIN B-12: CPT

## 2023-03-20 PROCEDURE — 85027 COMPLETE CBC AUTOMATED: CPT

## 2023-03-21 LAB
25(OH)D3 SERPL-MCNC: 63.9 NG/ML (ref 30–100)
ALBUMIN SERPL-MCNC: 4.8 G/DL (ref 3.5–5.2)
ALBUMIN/GLOB SERPL: 1.9 G/DL
ALP SERPL-CCNC: 91 U/L (ref 39–117)
ALT SERPL W P-5'-P-CCNC: 11 U/L (ref 1–33)
ANION GAP SERPL CALCULATED.3IONS-SCNC: 13 MMOL/L (ref 5–15)
AST SERPL-CCNC: 32 U/L (ref 1–32)
BILIRUB SERPL-MCNC: 0.6 MG/DL (ref 0–1.2)
BUN SERPL-MCNC: 15 MG/DL (ref 8–23)
BUN/CREAT SERPL: 25.4 (ref 7–25)
CALCIUM SPEC-SCNC: 9.3 MG/DL (ref 8.6–10.5)
CHLORIDE SERPL-SCNC: 100 MMOL/L (ref 98–107)
CO2 SERPL-SCNC: 27 MMOL/L (ref 22–29)
CREAT SERPL-MCNC: 0.59 MG/DL (ref 0.57–1)
EGFRCR SERPLBLD CKD-EPI 2021: 100.2 ML/MIN/1.73
GLOBULIN UR ELPH-MCNC: 2.5 GM/DL
GLUCOSE SERPL-MCNC: 84 MG/DL (ref 65–99)
IRON 24H UR-MRATE: 139 MCG/DL (ref 37–145)
IRON SATN MFR SERPL: 38 % (ref 20–50)
POTASSIUM SERPL-SCNC: 4 MMOL/L (ref 3.5–5.2)
PROT SERPL-MCNC: 7.3 G/DL (ref 6–8.5)
SODIUM SERPL-SCNC: 140 MMOL/L (ref 136–145)
TIBC SERPL-MCNC: 364 MCG/DL (ref 298–536)
TRANSFERRIN SERPL-MCNC: 244 MG/DL (ref 200–360)
VIT B12 BLD-MCNC: 562 PG/ML (ref 211–946)

## 2023-03-21 NOTE — TELEPHONE ENCOUNTER
I talked with pt, I informed her that since her last colonoscopy was in 2019 and she had recent + Cologuard, I will talk with Dr. Martínez to see if she needs to be seen in the office prior to scheduling.

## 2023-03-24 NOTE — PROGRESS NOTES
Patient: Brea Perez    YOB: 1955    Date: 03/27/2023    Primary Care Provider: Massiel Mcbride MD    Chief Complaint   Patient presents with   • Colonoscopy     positive Cologuard       SUBJECTIVE:    History of present illness:  I saw the patient in the office today as a consultation for evaluation and treatment of recent positive Cologuard.  Patients last colonoscopy was performed 08/13/2019, it was normal.    She did have a recent positive Cologuard test, she is to had no other symptomatology.    The following portions of the patient's history were reviewed and updated as appropriate: allergies, current medications, past family history, past medical history, past social history, past surgical history and problem list.    Review of Systems   Constitutional: Negative for chills, fever and unexpected weight change.   HENT: Negative for hearing loss, trouble swallowing and voice change.    Eyes: Negative for visual disturbance.   Respiratory: Negative for apnea, cough, chest tightness, shortness of breath and wheezing.    Cardiovascular: Negative for chest pain, palpitations and leg swelling.   Gastrointestinal: Negative for abdominal distention, abdominal pain, anal bleeding, blood in stool, constipation, diarrhea, nausea, rectal pain and vomiting.   Endocrine: Negative for cold intolerance and heat intolerance.   Genitourinary: Negative for difficulty urinating, dysuria and flank pain.   Musculoskeletal: Negative for back pain and gait problem.   Skin: Negative for color change, rash and wound.   Neurological: Negative for dizziness, syncope, speech difficulty, weakness, light-headedness, numbness and headaches.   Hematological: Negative for adenopathy. Does not bruise/bleed easily.   Psychiatric/Behavioral: Negative for confusion. The patient is not nervous/anxious.        History:  Past Medical History:   Diagnosis Date   • Abnormal Pap smear of cervix 1996   • Anxiety    • Chronic  constipation 1988   • COVID-19 vaccine series completed    • Depression    • Fibroid, uterine    • H/O mammogram 11/09/2015   • Hearing loss     Reported mild and that she does not have hearing aids at present time   • Herpes 10/30/2017   • History of colon polyps    • History of endometrial biopsy 08/08/2012    POST MENOPAUSAL BLEEDING WITH UTERINE LEIOMOMA   • History of Papanicolaou smear of cervix 12/22/2015    NORMAL    • Post-menopausal atrophic vaginitis    • Recurrent urinary tract infection    • Seasonal allergies    • Varicella    • Wears glasses     Rx glasses for reading and watching TV PRN       Past Surgical History:   Procedure Laterality Date   • COLON SURGERY  2011    Scar tissue removed secondary to colon surgery that was done in 2003   • COLONOSCOPY     • COLONOSCOPY N/A 08/13/2019    Procedure: COLONOSCOPY;  Surgeon: Jesus Manuel Martínez MD;  Location: [facility] ENDOSCOPY;  Service: Gastroenterology   • D & C HYSTEROSCOPY N/A 8/12/2022    Procedure: DILATATION AND CURETTAGE HYSTEROSCOPY;  Surgeon: Odalys Agudelo MD;  Location: [facility] OR;  Service: Obstetrics/Gynecology;  Laterality: N/A;   • DILATATION AND CURETTAGE     • LAPAROSCOPIC CHOLECYSTECTOMY  2008   • LASIK  2008   • SMALL INTESTINE SURGERY  2003    SBO w/ resection & reanastamosis   • TONSILLECTOMY     • TUBAL ABDOMINAL LIGATION     • VAGINAL DELIVERY      x2   • WISDOM TOOTH EXTRACTION  1971       Family History   Problem Relation Age of Onset   • Arthritis Mother    • Hyperlipidemia Mother    • Dementia Mother    • Hypertension Mother    • Thyroid disease Mother    • Cancer Mother         skin   • Deep vein thrombosis Mother    • Stroke Mother    • Hypertension Father    • Alzheimer's disease Father    • Cancer Father         skin   • Melanoma Father    • Prostate cancer Father    • No Known Problems Sister    • No Known Problems Brother    • Cancer Maternal Uncle         lung   • Cancer Maternal Grandmother         colon   • Colon cancer  Maternal Grandmother    • COPD Maternal Grandfather    • Cerebral aneurysm Paternal Grandmother    • Alcohol abuse Paternal Grandfather    • COPD Paternal Grandfather    • Breast cancer Neg Hx    • Ovarian cancer Neg Hx        Social History     Tobacco Use   • Smoking status: Former     Packs/day: 0.25     Years: 5.00     Pack years: 1.25     Types: Cigarettes     Quit date: 1981     Years since quittin.2   • Smokeless tobacco: Never   Vaping Use   • Vaping Use: Never used   Substance Use Topics   • Alcohol use: Yes     Alcohol/week: 20.0 standard drinks     Types: 20 Cans of beer per week     Comment: Patient denies history of etoh abuse   • Drug use: No       Allergies:  No Known Allergies    Medications:    Current Outpatient Medications:   •  amitriptyline (ELAVIL) 50 MG tablet, Take 1 tablet by mouth Every Night., Disp: , Rfl:   •  Ascorbic Acid (Vitamin C) 500 MG capsule, , Disp: , Rfl:   •  betamethasone valerate (VALISONE) 0.1 % cream, Apply 1 application topically to the appropriate area as directed 1 (One) Time Per Week., Disp: , Rfl:   •  busPIRone (BUSPAR) 10 MG tablet, Take 1 tablet by mouth 2 (Two) Times a Day., Disp: 60 tablet, Rfl: 5  •  Calcium Carb-Cholecalciferol (CALCIUM 500 + D3 PO), , Disp: , Rfl:   •  erythromycin (ROMYCIN) 5 MG/GM ophthalmic ointment, erythromycin 5 mg/gram (0.5 %) eye ointment  APPLY 1 CM RIBBON INTO THE LOWER CONJUNCTIVAL SAC(S) IN THE AFFECTED EYE(S) BY OPHTHALMIC ROUTE 3 TIMES PER DAY, Disp: , Rfl:   •  estradiol (VAGIFEM) 10 MCG tablet vaginal tablet, Insert 1 tablet into the vagina 2 (Two) Times a Week., Disp: 8 tablet, Rfl: 11  •  ibuprofen (ADVIL,MOTRIN) 600 MG tablet, Take 1 tablet by mouth Every 6 (Six) Hours As Needed for Mild Pain ., Disp: 60 tablet, Rfl: 0  •  lamoTRIgine  MG tablet sustained-release 24 hour, Take 200 mg by mouth Daily., Disp: , Rfl:   •  Multiple Vitamins-Minerals (OCUVITE ADULT 50+ PO), Take 1 tablet by mouth Daily., Disp: ,  "Rfl:   •  Omega-3 350 MG capsule delayed-release, Take 350 mg by mouth Daily. With 1200mg fish oil - combination OTC supplement, Disp: , Rfl:   •  ondansetron (ZOFRAN) 8 MG tablet, Take 1 tablet by mouth Every 8 (Eight) Hours As Needed for Nausea or Vomiting., Disp: 15 tablet, Rfl: 0  •  valACYclovir (VALTREX) 500 MG tablet, Take 1 tablet by mouth 2 (Two) Times a Day As Needed., Disp: , Rfl:   •  lamoTRIgine (LaMICtal) 100 MG tablet, Take 200 mg by mouth Daily. (Patient not taking: Reported on 3/27/2023), Disp: , Rfl:   •  Multiple Vitamins-Minerals (CENTRUM SILVER ADULT 50+ PO), Take 1 tablet by mouth Daily., Disp: , Rfl:   •  mupirocin (BACTROBAN) 2 % ointment, Apply 1 application topically to the appropriate area as directed 3 (Three) Times a Day As Needed (cold sores). (Patient not taking: Reported on 3/27/2023), Disp: , Rfl:     OBJECTIVE:    Vital Signs:   Vitals:    03/27/23 0906   BP: 138/82   Pulse: 107   Resp: 18   Temp: 97.5 °F (36.4 °C)   TempSrc: Temporal   SpO2: 99%   Weight: 52.6 kg (116 lb)   Height: 165.1 cm (65\")       Physical Exam:   General Appearance:    Alert, cooperative, in no acute distress   Head:    Normocephalic, without obvious abnormality, atraumatic   Eyes:            Lids and lashes normal, conjunctivae and sclerae normal, no   icterus, no pallor, corneas clear, PERRL   Ears:    Ears appear intact with no abnormalities noted   Throat:   No oral lesions, no thrush, oral mucosa moist   Neck:   No adenopathy, supple, trachea midline, no thyromegaly,  no JVD   Lungs:     Clear to auscultation,respirations regular, even and                  unlabored    Heart:    Regular rhythm and normal rate, normal S1 and S2, no            murmur   Abdomen:     no masses, no organomegaly, soft non-tender, non-distended, no guarding, there is no evidence of tenderness, no peritoneal signs   Extremities:   Moves all extremities well, no edema, no cyanosis, no             redness   Pulses:   Pulses " palpable and equal bilaterally   Skin:   No bleeding, bruising or rash   Lymph nodes:   No palpable adenopathy   Neurologic:   Cranial nerves 2 - 12 grossly intact, sensation intact      Results Review:   I reviewed the patient's new clinical results.  I reviewed the patient's new imaging results and agree with the interpretation.  I reviewed the patient's other test results and agree with the interpretation    Review of Systems was reviewed and confirmed as accurate as documented by the MA.    ASSESSMENT/PLAN:    1. Positive colorectal cancer screening using Cologuard test        I recommend a colonoscopy for further evaluation. The procedure was explained as well as the risks which include but are not limited to bleeding, infection, perforation, abdominal pain etc. The patient understands these risks and the procedure and wishes to proceed.     Electronically signed by Jesus Manuel Martínez MD  03/27/23 13:22 EDT

## 2023-03-27 ENCOUNTER — OFFICE VISIT (OUTPATIENT)
Dept: SURGERY | Facility: CLINIC | Age: 68
End: 2023-03-27
Payer: MEDICARE

## 2023-03-27 VITALS
WEIGHT: 116 LBS | OXYGEN SATURATION: 99 % | HEART RATE: 107 BPM | HEIGHT: 65 IN | TEMPERATURE: 97.5 F | DIASTOLIC BLOOD PRESSURE: 82 MMHG | SYSTOLIC BLOOD PRESSURE: 138 MMHG | RESPIRATION RATE: 18 BRPM | BODY MASS INDEX: 19.33 KG/M2

## 2023-03-27 DIAGNOSIS — R19.5 POSITIVE COLORECTAL CANCER SCREENING USING COLOGUARD TEST: Primary | ICD-10-CM

## 2023-03-27 PROCEDURE — 1160F RVW MEDS BY RX/DR IN RCRD: CPT | Performed by: SURGERY

## 2023-03-27 PROCEDURE — 99203 OFFICE O/P NEW LOW 30 MIN: CPT | Performed by: SURGERY

## 2023-03-27 PROCEDURE — 1159F MED LIST DOCD IN RCRD: CPT | Performed by: SURGERY

## 2023-03-27 RX ORDER — BISACODYL 5 MG
TABLET, DELAYED RELEASE (ENTERIC COATED) ORAL
Qty: 4 TABLET | Refills: 0 | Status: SHIPPED | OUTPATIENT
Start: 2023-03-27

## 2023-03-27 RX ORDER — POLYETHYLENE GLYCOL 3350 17 G/17G
238 POWDER, FOR SOLUTION ORAL ONCE
Qty: 238 G | Refills: 0 | Status: SHIPPED | OUTPATIENT
Start: 2023-03-27 | End: 2023-03-27

## 2023-04-25 ENCOUNTER — TELEPHONE (OUTPATIENT)
Dept: SURGERY | Facility: CLINIC | Age: 68
End: 2023-04-25
Payer: MEDICARE

## 2023-04-25 NOTE — TELEPHONE ENCOUNTER
Called to confirm colonoscopy,04/28/23 Dr Martínez, @ [facility]  Received busy signal, Called patient spouse left message on his phone to have patient call.

## 2023-04-28 ENCOUNTER — OUTSIDE FACILITY SERVICE (OUTPATIENT)
Dept: SURGERY | Facility: CLINIC | Age: 68
End: 2023-04-28
Payer: MEDICARE

## 2023-05-26 ENCOUNTER — HOSPITAL ENCOUNTER (OUTPATIENT)
Dept: MAMMOGRAPHY | Facility: HOSPITAL | Age: 68
Discharge: HOME | End: 2023-05-26
Admitting: OBSTETRICS & GYNECOLOGY
Payer: MEDICARE

## 2023-05-26 DIAGNOSIS — Z12.31 SCREENING MAMMOGRAM FOR BREAST CANCER: ICD-10-CM

## 2023-05-26 PROCEDURE — 77067 SCR MAMMO BI INCL CAD: CPT

## 2023-05-26 PROCEDURE — 77063 BREAST TOMOSYNTHESIS BI: CPT

## 2024-01-26 ENCOUNTER — LAB (OUTPATIENT)
Dept: LAB | Facility: HOSPITAL | Age: 69
End: 2024-01-26
Payer: MEDICARE

## 2024-01-26 ENCOUNTER — TRANSCRIBE ORDERS (OUTPATIENT)
Dept: LAB | Facility: HOSPITAL | Age: 69
End: 2024-01-26
Payer: MEDICARE

## 2024-01-26 DIAGNOSIS — D50.0 IRON DEFICIENCY ANEMIA SECONDARY TO BLOOD LOSS (CHRONIC): ICD-10-CM

## 2024-01-26 DIAGNOSIS — E78.2 MIXED HYPERLIPIDEMIA: Primary | ICD-10-CM

## 2024-01-26 DIAGNOSIS — E53.8 BIOTIN-(PROPIONYL-COA-CARBOXYLASE) LIGASE DEFICIENCY: ICD-10-CM

## 2024-01-26 DIAGNOSIS — E55.9 VITAMIN D DEFICIENCY: ICD-10-CM

## 2024-01-26 DIAGNOSIS — E78.2 MIXED HYPERLIPIDEMIA: ICD-10-CM

## 2024-01-26 LAB
25(OH)D3 SERPL-MCNC: 54.2 NG/ML (ref 30–100)
ALBUMIN SERPL-MCNC: 4.6 G/DL (ref 3.5–5.2)
ALBUMIN/GLOB SERPL: 2.1 G/DL
ALP SERPL-CCNC: 90 U/L (ref 39–117)
ALT SERPL W P-5'-P-CCNC: 13 U/L (ref 1–33)
ANION GAP SERPL CALCULATED.3IONS-SCNC: 10 MMOL/L (ref 5–15)
AST SERPL-CCNC: 19 U/L (ref 1–32)
BILIRUB SERPL-MCNC: 0.3 MG/DL (ref 0–1.2)
BUN SERPL-MCNC: 15 MG/DL (ref 8–23)
BUN/CREAT SERPL: 18.1 (ref 7–25)
CALCIUM SPEC-SCNC: 9.5 MG/DL (ref 8.6–10.5)
CHLORIDE SERPL-SCNC: 100 MMOL/L (ref 98–107)
CHOLEST SERPL-MCNC: 235 MG/DL (ref 0–200)
CO2 SERPL-SCNC: 27 MMOL/L (ref 22–29)
CREAT SERPL-MCNC: 0.83 MG/DL (ref 0.57–1)
DEPRECATED RDW RBC AUTO: 38.4 FL (ref 37–54)
EGFRCR SERPLBLD CKD-EPI 2021: 77.9 ML/MIN/1.73
ERYTHROCYTE [DISTWIDTH] IN BLOOD BY AUTOMATED COUNT: 11.7 % (ref 12.3–15.4)
GLOBULIN UR ELPH-MCNC: 2.2 GM/DL
GLUCOSE SERPL-MCNC: 89 MG/DL (ref 65–99)
HCT VFR BLD AUTO: 39.1 % (ref 34–46.6)
HDLC SERPL-MCNC: 96 MG/DL (ref 40–60)
HGB BLD-MCNC: 13.5 G/DL (ref 12–15.9)
IRON 24H UR-MRATE: 104 MCG/DL (ref 37–145)
IRON SATN MFR SERPL: 29 % (ref 20–50)
LDLC SERPL CALC-MCNC: 131 MG/DL (ref 0–100)
LDLC/HDLC SERPL: 1.35 {RATIO}
MCH RBC QN AUTO: 31.6 PG (ref 26.6–33)
MCHC RBC AUTO-ENTMCNC: 34.5 G/DL (ref 31.5–35.7)
MCV RBC AUTO: 91.6 FL (ref 79–97)
PLATELET # BLD AUTO: 326 10*3/MM3 (ref 140–450)
PMV BLD AUTO: 8.9 FL (ref 6–12)
POTASSIUM SERPL-SCNC: 5 MMOL/L (ref 3.5–5.2)
PROT SERPL-MCNC: 6.8 G/DL (ref 6–8.5)
RBC # BLD AUTO: 4.27 10*6/MM3 (ref 3.77–5.28)
SODIUM SERPL-SCNC: 137 MMOL/L (ref 136–145)
TIBC SERPL-MCNC: 359 MCG/DL (ref 298–536)
TRANSFERRIN SERPL-MCNC: 241 MG/DL (ref 200–360)
TRIGL SERPL-MCNC: 49 MG/DL (ref 0–150)
VIT B12 BLD-MCNC: 404 PG/ML (ref 211–946)
VLDLC SERPL-MCNC: 8 MG/DL (ref 5–40)
WBC NRBC COR # BLD AUTO: 4.48 10*3/MM3 (ref 3.4–10.8)

## 2024-01-26 PROCEDURE — 85027 COMPLETE CBC AUTOMATED: CPT

## 2024-01-26 PROCEDURE — 82306 VITAMIN D 25 HYDROXY: CPT

## 2024-01-26 PROCEDURE — 84466 ASSAY OF TRANSFERRIN: CPT

## 2024-01-26 PROCEDURE — 82607 VITAMIN B-12: CPT

## 2024-01-26 PROCEDURE — 80053 COMPREHEN METABOLIC PANEL: CPT

## 2024-01-26 PROCEDURE — 80061 LIPID PANEL: CPT

## 2024-01-26 PROCEDURE — 36415 COLL VENOUS BLD VENIPUNCTURE: CPT

## 2024-01-26 PROCEDURE — 83540 ASSAY OF IRON: CPT

## 2024-04-15 ENCOUNTER — TRANSCRIBE ORDERS (OUTPATIENT)
Dept: ADMINISTRATIVE | Facility: HOSPITAL | Age: 69
End: 2024-04-15
Payer: MEDICARE

## 2024-04-15 DIAGNOSIS — Z12.31 VISIT FOR SCREENING MAMMOGRAM: Primary | ICD-10-CM

## 2024-05-14 ENCOUNTER — OFFICE VISIT (OUTPATIENT)
Dept: OBSTETRICS AND GYNECOLOGY | Facility: CLINIC | Age: 69
End: 2024-05-14
Payer: MEDICARE

## 2024-05-14 VITALS
DIASTOLIC BLOOD PRESSURE: 88 MMHG | WEIGHT: 112 LBS | SYSTOLIC BLOOD PRESSURE: 132 MMHG | BODY MASS INDEX: 17.58 KG/M2 | HEIGHT: 67 IN

## 2024-05-14 DIAGNOSIS — Z12.31 ENCOUNTER FOR SCREENING MAMMOGRAM FOR BREAST CANCER: ICD-10-CM

## 2024-05-14 DIAGNOSIS — N94.10 DYSPAREUNIA IN FEMALE: ICD-10-CM

## 2024-05-14 DIAGNOSIS — N95.2 POSTMENOPAUSAL ATROPHIC VAGINITIS: Primary | ICD-10-CM

## 2024-05-14 DIAGNOSIS — M85.852 OSTEOPENIA OF LEFT HIP: ICD-10-CM

## 2024-05-14 DIAGNOSIS — R68.82 DECREASED LIBIDO: ICD-10-CM

## 2024-05-14 PROCEDURE — 1159F MED LIST DOCD IN RCRD: CPT | Performed by: OBSTETRICS & GYNECOLOGY

## 2024-05-14 PROCEDURE — 1160F RVW MEDS BY RX/DR IN RCRD: CPT | Performed by: OBSTETRICS & GYNECOLOGY

## 2024-05-14 PROCEDURE — 99214 OFFICE O/P EST MOD 30 MIN: CPT | Performed by: OBSTETRICS & GYNECOLOGY

## 2024-05-14 RX ORDER — ESTRADIOL 10 UG/1
1 INSERT VAGINAL 2 TIMES WEEKLY
Qty: 8 TABLET | Refills: 11 | Status: SHIPPED | OUTPATIENT
Start: 2024-05-16

## 2024-05-14 NOTE — PROGRESS NOTES
Chief Complaint  Gynecologic Exam (No exam today, patient complains of decrease libido. )     History of Present Illness:  Patient is 68 y.o.  who presents to [facility] here for evaluation of vaginal dryness and decreased libido.  Patient had a Pap smear in February of last year.  She does have a mammogram scheduled for later this month.  She reports using estrogen cream in the past.  She continued to have vaginal dryness and did not feel the cream was helping.  She has not been on any medications recently.  She does report having dyspareunia as well.  She sees Dr. Best for her primary care.  She has had laboratory assessment.  She has labs every 4 to 6 months.  She did have a colonoscopy in March of last year with Dr. Martínez.  She did have previous bone density scan showing osteopenia.  She is on multivitamins with calcium.  She did have labs earlier this year.    History  Past Medical History:   Diagnosis Date    Abnormal Pap smear of cervix     Anxiety     Chronic constipation     COVID-19 vaccine series completed     Depression     Fibroid, uterine     H/O mammogram 2015    Hearing loss     Reported mild and that she does not have hearing aids at present time    Herpes 10/30/2017    History of colon polyps     History of endometrial biopsy 2012    POST MENOPAUSAL BLEEDING WITH UTERINE LEIOMOMA    History of Papanicolaou smear of cervix 2015    NORMAL     Post-menopausal atrophic vaginitis     Recurrent urinary tract infection     Seasonal allergies     Varicella     Wears glasses     Rx glasses for reading and watching TV PRN     Current Outpatient Medications on File Prior to Visit   Medication Sig Dispense Refill    amitriptyline (ELAVIL) 50 MG tablet Take 1 tablet by mouth Every Night.      Ascorbic Acid (Vitamin C) 500 MG capsule       betamethasone valerate (VALISONE) 0.1 % cream Apply 1 application topically to the appropriate area as directed 1  (One) Time Per Week.      bisacodyl (Dulcolax) 5 MG EC tablet Take 2 @ 3pm, 2 @ 7 pm day prior to colonoscopy 4 tablet 0    busPIRone (BUSPAR) 10 MG tablet Take 1 tablet by mouth 2 (Two) Times a Day. 60 tablet 5    Calcium Carb-Cholecalciferol (CALCIUM 500 + D3 PO)       erythromycin (ROMYCIN) 5 MG/GM ophthalmic ointment erythromycin 5 mg/gram (0.5 %) eye ointment   APPLY 1 CM RIBBON INTO THE LOWER CONJUNCTIVAL SAC(S) IN THE AFFECTED EYE(S) BY OPHTHALMIC ROUTE 3 TIMES PER DAY      ibuprofen (ADVIL,MOTRIN) 600 MG tablet Take 1 tablet by mouth Every 6 (Six) Hours As Needed for Mild Pain . 60 tablet 0    lamoTRIgine (LaMICtal) 100 MG tablet Take 200 mg by mouth Daily. (Patient not taking: Reported on 3/27/2023)      lamoTRIgine  MG tablet sustained-release 24 hour Take 200 mg by mouth Daily.      Multiple Vitamins-Minerals (CENTRUM SILVER ADULT 50+ PO) Take 1 tablet by mouth Daily.      Multiple Vitamins-Minerals (OCUVITE ADULT 50+ PO) Take 1 tablet by mouth Daily.      mupirocin (BACTROBAN) 2 % ointment Apply 1 application topically to the appropriate area as directed 3 (Three) Times a Day As Needed (cold sores). (Patient not taking: Reported on 3/27/2023)      Omega-3 350 MG capsule delayed-release Take 350 mg by mouth Daily. With 1200mg fish oil - combination OTC supplement      ondansetron (ZOFRAN) 8 MG tablet Take 1 tablet by mouth Every 8 (Eight) Hours As Needed for Nausea or Vomiting. 15 tablet 0    valACYclovir (VALTREX) 500 MG tablet Take 1 tablet by mouth 2 (Two) Times a Day As Needed.       No current facility-administered medications on file prior to visit.     No Known Allergies  Past Surgical History:   Procedure Laterality Date    COLON SURGERY  2011    Scar tissue removed secondary to colon surgery that was done in 2003    COLONOSCOPY      COLONOSCOPY N/A 08/13/2019    Procedure: COLONOSCOPY;  Surgeon: Jesus Manuel Martínez MD;  Location: [facility] ENDOSCOPY;  Service: Gastroenterology    D & C  "HYSTEROSCOPY N/A 2022    Procedure: DILATATION AND CURETTAGE HYSTEROSCOPY;  Surgeon: Odalys Agudelo MD;  Location: [facility];  Service: Obstetrics/Gynecology;  Laterality: N/A;    DILATATION AND CURETTAGE      LAPAROSCOPIC CHOLECYSTECTOMY  2008    LASIK  2008    SMALL INTESTINE SURGERY      SBO w/ resection & reanastamosis    TONSILLECTOMY      TUBAL ABDOMINAL LIGATION      VAGINAL DELIVERY      x2    WISDOM TOOTH EXTRACTION  1971     Family History   Problem Relation Age of Onset    Arthritis Mother     Hyperlipidemia Mother     Dementia Mother     Hypertension Mother     Thyroid disease Mother     Cancer Mother         skin    Deep vein thrombosis Mother     Stroke Mother     Hypertension Father     Alzheimer's disease Father     Cancer Father         skin    Melanoma Father     Prostate cancer Father     No Known Problems Sister     No Known Problems Brother     Cancer Maternal Uncle         lung    Cancer Maternal Grandmother         colon    Colon cancer Maternal Grandmother     COPD Maternal Grandfather     Cerebral aneurysm Paternal Grandmother     Alcohol abuse Paternal Grandfather     COPD Paternal Grandfather     Breast cancer Neg Hx     Ovarian cancer Neg Hx      Social History     Socioeconomic History    Marital status:    Tobacco Use    Smoking status: Former     Current packs/day: 0.00     Average packs/day: 0.3 packs/day for 5.0 years (1.3 ttl pk-yrs)     Types: Cigarettes     Start date: 1976     Quit date: 1981     Years since quittin.3    Smokeless tobacco: Never   Vaping Use    Vaping status: Never Used   Substance and Sexual Activity    Alcohol use: Yes     Alcohol/week: 20.0 standard drinks of alcohol     Types: 20 Cans of beer per week     Comment: Patient denies history of etoh abuse    Drug use: No    Sexual activity: Yes     Partners: Male     Birth control/protection: Surgical       Physical Examination:  Vital Signs: /88   Ht 170.2 cm (67\")   Wt 50.8 " kg (112 lb)   BMI 17.54 kg/m²     General Appearance: alert, appears stated age, and cooperative  Breasts: Not performed.  Abdomen: no masses, no hepatomegaly, no splenomegaly, soft non-tender, no guarding, and no rebound tenderness  Pelvic: Not performed.    Data Review:  The following data was reviewed by: Odalys Agudelo MD on 05/14/2024:     Labs:  CBC (No Diff) (01/26/2024 09:00)  Comprehensive Metabolic Panel (01/26/2024 09:00)  Lipid Panel (01/26/2024 09:00)  Vitamin B12 (01/26/2024 09:00)  Iron Profile (01/26/2024 09:00)  Vitamin D 25 Hydroxy (01/26/2024 09:00)  Imaging:  DEXA Bone Density Axial (08/30/2022 08:51)  Mammo Screening Digital Tomosynthesis Bilateral With CAD (05/26/2023 10:40)  OCT, Optic Nerve - OU - Both Eyes (03/24/2023 10:02)  OCT, Optic Nerve - OU - Both Eyes (03/29/2024 09:39)  Medical Records:  External Facility Surgical / Procedural Request (03/27/2023 09:28)     Assessment and Plan   1. Postmenopausal atrophic vaginitis  Discussed various options for relief of atrophic vaginal symptoms related to menopause. Discussed local therapy for treatment of vaginal symptoms only.  Discussed the different formulation options including cream, ring, and tablets.  Discussed the low risk of systemic absorption in postmenopausal women with atrophy using 25 mcgs of estradiol on a daily basis.  Recommend low dose use 2-3x/wk for maintenance of treatment.  Other treatment options were discussed including the use of water-based and silicone-based vaginal lubricants and moisturizers.  Also discussed was the FDA approved treatment option of ospemifene for moderate to severe dyspareunia.   Rx is given for Vagifem as noted.  Instructions and precautions have been given.  Patient is to follow-up if no improvement in her symptoms in 4 to 6 weeks.  - estradiol (VAGIFEM) 10 MCG tablet vaginal tablet; Insert 1 tablet into the vagina 2 (Two) Times a Week.  Dispense: 8 tablet; Refill: 11    2. Encounter for screening  mammogram for breast cancer  It is recommended per ACOG, for women at average risk to start annual mammogram screening at the age of 40 until the age of 75 and an individualized decision be made for women after age 75.  She was encouraged to continue getting yearly mammograms.  She should report any palpable breast lump(s) or skin changes regardless of mammographic findings.  I explained to Brea that notification regarding her mammogram results will come from the center performing the study.  Our office will not be routinely calling with mammogram results.  It is her responsibility to make sure that the results from the mammogram are communicated to her by the breast center.  If she has any questions about the results, she is welcome to call our office anytime.  The patient reports she has mammogram scheduled.    Brea was counseled regarding having clinical breast exams and breast self-awareness.  Women aged 29-39 years of age should have clinical breast exams every 1-3 years and yearly aged 40 and older.  The patient was counseled regarding breast self-awareness focusing on having a sense of what is normal for her breasts so that she can tell if there are changes.  Even small changes should be reported to provider.     3. Osteopenia of left hip  Patient with osteopenia is noted.  Repeat bone density scan as discussed.  Patient is to continue her calcium and vitamin D supplementation.  - DEXA Bone Density Axial; Future    4. Dyspareunia in female  I have discussed with the patient the need for water-based or silicone based lubricants.  Prescription is given for Vagifem as noted.  - estradiol (VAGIFEM) 10 MCG tablet vaginal tablet; Insert 1 tablet into the vagina 2 (Two) Times a Week.  Dispense: 8 tablet; Refill: 11    5. Decreased libido  Will plan a trial with Vagifem as discussed.  Instructions and precautions have been given.  Patient is to follow-up if no improvement in her symptoms as discussed.    Follow  Up/Instructions:  Follow up as noted.  Patient was given instructions and counseling regarding her condition or for health maintenance advice. Please see specific information pulled into the AVS if appropriate.     Note: Speech recognition transcription software may have been used to dictate portions of this document.  An attempt at proofreading has been made though minor errors in transcription may still be present.    This note was electronically signed.  Odalys Agudelo M.D.

## 2024-05-28 ENCOUNTER — HOSPITAL ENCOUNTER (OUTPATIENT)
Dept: MAMMOGRAPHY | Facility: HOSPITAL | Age: 69
Discharge: HOME | End: 2024-05-28
Admitting: OBSTETRICS & GYNECOLOGY
Payer: MEDICARE

## 2024-05-28 DIAGNOSIS — Z12.31 VISIT FOR SCREENING MAMMOGRAM: ICD-10-CM

## 2024-05-28 PROCEDURE — 77063 BREAST TOMOSYNTHESIS BI: CPT

## 2024-05-28 PROCEDURE — 77067 SCR MAMMO BI INCL CAD: CPT

## 2024-08-22 ENCOUNTER — HOSPITAL ENCOUNTER (OUTPATIENT)
Dept: GENERAL RADIOLOGY | Facility: HOSPITAL | Age: 69
Discharge: HOME | End: 2024-08-22
Admitting: FAMILY MEDICINE
Payer: MEDICARE

## 2024-08-22 DIAGNOSIS — M25.531 PAIN IN RIGHT WRIST: ICD-10-CM

## 2024-08-22 PROCEDURE — 73110 X-RAY EXAM OF WRIST: CPT

## 2024-08-23 ENCOUNTER — OFFICE VISIT (OUTPATIENT)
Dept: ORTHOPEDIC SURGERY | Facility: CLINIC | Age: 69
End: 2024-08-23
Payer: MEDICARE

## 2024-08-23 ENCOUNTER — TELEPHONE (OUTPATIENT)
Dept: ORTHOPEDIC SURGERY | Facility: CLINIC | Age: 69
End: 2024-08-23
Payer: MEDICARE

## 2024-08-23 VITALS
HEIGHT: 67 IN | DIASTOLIC BLOOD PRESSURE: 84 MMHG | BODY MASS INDEX: 18.36 KG/M2 | WEIGHT: 117 LBS | SYSTOLIC BLOOD PRESSURE: 120 MMHG

## 2024-08-23 DIAGNOSIS — S52.551A OTHER CLOSED EXTRA-ARTICULAR FRACTURE OF DISTAL END OF RIGHT RADIUS, INITIAL ENCOUNTER: Primary | ICD-10-CM

## 2024-08-23 RX ORDER — LAMOTRIGINE 50 MG/1
TABLET, EXTENDED RELEASE ORAL
COMMUNITY
Start: 2024-07-16

## 2024-08-23 NOTE — PROGRESS NOTES
Office Note     Name: Brea Perez    : 1955     MRN: [id number]     Chief Complaint  Pain of the Right Wrist (States she was walking down concrete stairs on 8/10/24 and missed the last step, she is unsure what she did to the wrist.)    Subjective     History of Present Illness:  Brea Perez is a 68 y.o. female presenting today for evaluation of acute right wrist injury after the events described above.  Today patient complains of pain at the radial styloid and radial aspect of the wrist.  She denies paresthesias.  She denies previous injuries or surgeries to the affected area.  She was seen at the ER after the injury where x-rays revealed a suspected nondisplaced fracture of the radial metaphysis.  She was placed in a wrist brace and given orthopedic follow-up.    Review of Systems   Constitutional:  Negative for fever.   HENT:  Negative for dental problem and voice change.    Eyes:  Negative for visual disturbance.   Respiratory:  Negative for shortness of breath.    Cardiovascular:  Negative for chest pain.   Gastrointestinal:  Negative for abdominal pain.   Genitourinary:  Negative for dysuria.   Musculoskeletal:  Positive for arthralgias (right wrist) and joint swelling (right wrist). Negative for gait problem.   Skin:  Negative for rash.   Neurological:  Negative for speech difficulty.   Hematological:  Does not bruise/bleed easily.   Psychiatric/Behavioral:  Negative for confusion.         Past Medical History:   Diagnosis Date    Abnormal Pap smear of cervix     Anxiety     Chronic constipation 1988    COVID-19 vaccine series completed     Depression     Fibroid, uterine     H/O mammogram 2015    Hearing loss     Reported mild and that she does not have hearing aids at present time    Herpes 10/30/2017    History of colon polyps     History of endometrial biopsy 2012    POST MENOPAUSAL BLEEDING WITH UTERINE LEIOMOMA    History of Papanicolaou smear of cervix  12/22/2015    NORMAL     Post-menopausal atrophic vaginitis     Recurrent urinary tract infection     Seasonal allergies     Varicella     Wears glasses     Rx glasses for reading and watching TV PRN        Past Surgical History:   Procedure Laterality Date    COLON SURGERY  2011    Scar tissue removed secondary to colon surgery that was done in 2003    COLONOSCOPY      COLONOSCOPY N/A 08/13/2019    Procedure: COLONOSCOPY;  Surgeon: Jesus Manuel Martínez MD;  Location: [facility] ENDOSCOPY;  Service: Gastroenterology    D & C HYSTEROSCOPY N/A 08/12/2022    Procedure: DILATATION AND CURETTAGE HYSTEROSCOPY;  Surgeon: Odalys Agudelo MD;  Location: [facility] OR;  Service: Obstetrics/Gynecology;  Laterality: N/A;    DILATATION AND CURETTAGE      LAPAROSCOPIC CHOLECYSTECTOMY  2008    LASIK  2008    SMALL INTESTINE SURGERY  2003    SBO w/ resection & reanastamosis    TONSILLECTOMY      TUBAL ABDOMINAL LIGATION      VAGINAL DELIVERY      x2    WISDOM TOOTH EXTRACTION  1971       Family History   Problem Relation Age of Onset    Arthritis Mother     Hyperlipidemia Mother     Dementia Mother     Hypertension Mother     Thyroid disease Mother     Cancer Mother         skin    Deep vein thrombosis Mother     Stroke Mother     Hypertension Father     Alzheimer's disease Father     Cancer Father         skin    Melanoma Father     Prostate cancer Father     No Known Problems Sister     No Known Problems Brother     Cancer Maternal Uncle         lung    Cancer Maternal Grandmother         colon    Colon cancer Maternal Grandmother     COPD Maternal Grandfather     Cerebral aneurysm Paternal Grandmother     Alcohol abuse Paternal Grandfather     COPD Paternal Grandfather     Breast cancer Neg Hx     Ovarian cancer Neg Hx        Social History     Socioeconomic History    Marital status:    Tobacco Use    Smoking status: Former     Current packs/day: 0.00     Average packs/day: 0.3 packs/day for 5.0 years (1.3 ttl pk-yrs)     Types:  Cigarettes     Start date: 1976     Quit date: 1981     Years since quittin.6    Smokeless tobacco: Never   Vaping Use    Vaping status: Never Used   Substance and Sexual Activity    Alcohol use: Yes     Alcohol/week: 20.0 standard drinks of alcohol     Types: 20 Cans of beer per week     Comment: Patient denies history of etoh abuse    Drug use: No    Sexual activity: Yes     Partners: Male     Birth control/protection: Surgical         Current Outpatient Medications:     lamoTRIgine ER 50 MG tablet sustained-release 24 hour, TAKE 1 TABLET BY MOUTH EVERY DAY WITH 100 MG TABLET, Disp: , Rfl:     mupirocin (BACTROBAN) 2 % ointment, Apply 1 Application topically to the appropriate area as directed 3 (Three) Times a Day As Needed (cold sores)., Disp: , Rfl:     amitriptyline (ELAVIL) 50 MG tablet, Take 1 tablet by mouth Every Night., Disp: , Rfl:     Ascorbic Acid (Vitamin C) 500 MG capsule, , Disp: , Rfl:     bisacodyl (Dulcolax) 5 MG EC tablet, Take 2 @ 3pm, 2 @ 7 pm day prior to colonoscopy, Disp: 4 tablet, Rfl: 0    busPIRone (BUSPAR) 10 MG tablet, Take 1 tablet by mouth 2 (Two) Times a Day., Disp: 60 tablet, Rfl: 5    Calcium Carb-Cholecalciferol (CALCIUM 500 + D3 PO), , Disp: , Rfl:     erythromycin (ROMYCIN) 5 MG/GM ophthalmic ointment, erythromycin 5 mg/gram (0.5 %) eye ointment  APPLY 1 CM RIBBON INTO THE LOWER CONJUNCTIVAL SAC(S) IN THE AFFECTED EYE(S) BY OPHTHALMIC ROUTE 3 TIMES PER DAY (Patient not taking: Reported on 2024), Disp: , Rfl:     estradiol (VAGIFEM) 10 MCG tablet vaginal tablet, Insert 1 tablet into the vagina 2 (Two) Times a Week. (Patient not taking: Reported on 2024), Disp: 8 tablet, Rfl: 11    lamoTRIgine (LaMICtal) 100 MG tablet, Take 200 mg by mouth Daily. (Patient not taking: Reported on 3/27/2023), Disp: , Rfl:     Multiple Vitamins-Minerals (CENTRUM SILVER ADULT 50+ PO), Take 1 tablet by mouth Daily., Disp: , Rfl:     Multiple Vitamins-Minerals (OCUVITE ADULT 50+  "PO), Take 1 tablet by mouth Daily., Disp: , Rfl:     Omega-3 350 MG capsule delayed-release, Take 350 mg by mouth Daily. With 1200mg fish oil - combination OTC supplement, Disp: , Rfl:     ondansetron (ZOFRAN) 8 MG tablet, Take 1 tablet by mouth Every 8 (Eight) Hours As Needed for Nausea or Vomiting., Disp: 15 tablet, Rfl: 0    valACYclovir (VALTREX) 500 MG tablet, Take 1 tablet by mouth 2 (Two) Times a Day As Needed., Disp: , Rfl:     No Known Allergies        Objective   /84   Ht 170.2 cm (67.01\")   Wt 53.1 kg (117 lb)   LMP  (LMP Unknown)   BMI 18.32 kg/m²    BMI is below normal parameters (malnutrition). Recommendations: none (medical contraindication)       Physical Exam  Right Hand Exam     Comments:  Mild soft tissue swelling is noted in the radial aspect of the wrist.  There is no bruising erythema.  No open wounds or gross deformity.  There is tenderness to the radial wrist.  No significant tenderness throughout the hand or proximal forearm.  No pain in the elbow.  Range of motion is mostly preserved in the wrist though there is pain with end range of motion flexion.  Full range of motion in all fingers.  Able to do thumbs up sign crossover sign and okay sign.  Gross sensation intact to light touch, cap refill brisk.           Extremity DVT signs are negative by clinical screen.     Independent Review of Radiographic Studies:    Reviewed images and agree with radiologist interpretation.    XR Wrist 3+ View Right  Order date: 8/22/2024  Authorizing: Massiel Mcbride MD  Ordered by Massiel Mcbride MD on 8/22/2024.     Narrative & Impression       PROCEDURE: XR WRIST 3+ VW RIGHT-     HISTORY: right wrist pain, concern for scaphoid fracture; M25.531-Pain  in right wrist     COMPARISON: None.     FINDINGS:  A three view exam demonstrates no acute dislocation. The  joint spaces demonstrate severe narrowing of the first metacarpal carpal  joint and the trapezial scaphoid joint consistent with " degenerative  change. Particular attention paid to the scaphoid which appears to be  intact. On the oblique view there is slight cortical step off of the  lateral distal right radial metaphysis. There is also an adjacent linear  density suggesting fracture although no medial cortical break is  identified. There appears to be slight soft tissue swelling in this  region. CT may be helpful for further evaluation. No soft tissue  abnormality is seen.        IMPRESSION:  Findings suggesting distal right radial metaphyseal fracture  laterally as described; CT may be helpful.                       This report was signed and finalized on 8/23/2024 7:09 AM by Geni Teresa MD.       Procedures    Assessment and Plan   Diagnoses and all orders for this visit:    1. Other closed extra-articular fracture of distal end of right radius, initial encounter (Primary)       Discussion of orthopedic goals  Orthopedic activities reviewed and patient expressed appreciation  Regular exercise as tolerated  Risk, benefits, and merits of treatment alternatives reviewed with the patient and questions answered  Patient guided on mobility and conditioning exercises  Ice, heat, and/or modalities as beneficial  Elevate arm for residual swelling  Reduced physical activity as appropriate and avoid offending activity  Weight bearing parameters reviewed  Use brace as instructed  Counseling on diet, nutrition, fitness exercise, and weight reduction goals    Recommendations/Plan:  Exercise, medications, injections, other patient advice, and return appointment as noted.  Patient is encouraged to call or return for any issues or concerns.    Transitions from Velcro wrist brace into a wrist brace with thumb spica.  Ice and heat as needed and beneficial along with over-the-counter analgesics as needed.  No significant use of right upper extremity.  Gentle range of motion of the wrist to reduce stiffness.  Follow-up in 2 weeks for repeat x-rays and  evaluation.    Return in about 2 weeks (around 9/6/2024) for XOA.  Patient agreeable to call or return sooner for any concerns.

## 2024-09-03 ENCOUNTER — APPOINTMENT (OUTPATIENT)
Dept: BONE DENSITY | Facility: HOSPITAL | Age: 69
End: 2024-09-03
Payer: MEDICARE

## 2024-09-03 DIAGNOSIS — M85.852 OSTEOPENIA OF LEFT HIP: ICD-10-CM

## 2024-09-03 PROCEDURE — 77080 DXA BONE DENSITY AXIAL: CPT

## 2024-09-05 DIAGNOSIS — S52.551A OTHER CLOSED EXTRA-ARTICULAR FRACTURE OF DISTAL END OF RIGHT RADIUS, INITIAL ENCOUNTER: Primary | ICD-10-CM

## 2024-09-06 ENCOUNTER — OFFICE VISIT (OUTPATIENT)
Dept: ORTHOPEDIC SURGERY | Facility: CLINIC | Age: 69
End: 2024-09-06
Payer: MEDICARE

## 2024-09-06 VITALS
WEIGHT: 118.6 LBS | SYSTOLIC BLOOD PRESSURE: 126 MMHG | BODY MASS INDEX: 18.62 KG/M2 | HEIGHT: 67 IN | DIASTOLIC BLOOD PRESSURE: 90 MMHG

## 2024-09-06 DIAGNOSIS — S52.551D OTHER CLOSED EXTRA-ARTICULAR FRACTURE OF DISTAL END OF RIGHT RADIUS WITH ROUTINE HEALING, SUBSEQUENT ENCOUNTER: Primary | ICD-10-CM

## 2024-09-06 PROBLEM — H52.31 ANISOMETROPIA: Status: ACTIVE | Noted: 2021-02-04

## 2024-09-06 PROBLEM — H52.223 HYPEROPIA OF BOTH EYES WITH REGULAR ASTIGMATISM: Status: ACTIVE | Noted: 2019-03-07

## 2024-09-06 PROBLEM — H52.4 BILATERAL PRESBYOPIA: Status: ACTIVE | Noted: 2021-02-04

## 2024-09-06 PROBLEM — H25.813 MIXED TYPE AGE-RELATED CATARACT, BOTH EYES: Status: ACTIVE | Noted: 2019-03-07

## 2024-09-06 PROBLEM — H47.393 SUSPICIOUS OPTIC NERVE CUPPING OF BOTH EYES: Status: ACTIVE | Noted: 2019-03-07

## 2024-09-06 PROBLEM — H52.03 HYPEROPIA OF BOTH EYES WITH REGULAR ASTIGMATISM: Status: ACTIVE | Noted: 2019-03-07

## 2024-09-06 NOTE — PROGRESS NOTES
Office Note     Name: Brea Perez    : 1955     MRN: [id number]     Chief Complaint  Follow-up of the Right Wrist (Other closed extra-articular fracture of distal end of right radius, DOI: 8/10/24. States she is doing better, she is still having a little pain and has had some numbness in her thumb for the next few days.)    Subjective     History of Present Illness:  Brea Perez is a 68 y.o. female presenting today for follow-up for right wrist distal radius fracture.  Patient states overall her symptoms are significantly improved though she does continue to have some discomfort in the distal forearm.  She also notes decrease sensation over the dorsal aspect of her thumb which has been going on for the past 2 to 3 days.  She denies overt numbness.  Denies any worsening symptoms.  Reports good compliance with her brace and range of motion exercises.    Review of Systems   Musculoskeletal:  Positive for arthralgias (right wrist).   Neurological:  Positive for weakness (right thumb).        Past Medical History:   Diagnosis Date    Abnormal Pap smear of cervix     Anxiety     Chronic constipation     COVID-19 vaccine series completed     Depression     Fibroid, uterine     H/O mammogram 2015    Hearing loss     Reported mild and that she does not have hearing aids at present time    Herpes 10/30/2017    History of colon polyps     History of endometrial biopsy 2012    POST MENOPAUSAL BLEEDING WITH UTERINE LEIOMOMA    History of Papanicolaou smear of cervix 2015    NORMAL     Hyperlipidemia 2015    Post-menopausal atrophic vaginitis     Recurrent urinary tract infection     Seasonal allergies     Varicella     Wears glasses     Rx glasses for reading and watching TV PRN        Past Surgical History:   Procedure Laterality Date    COLON SURGERY      Scar tissue removed secondary to colon surgery that was done in     COLONOSCOPY      COLONOSCOPY N/A  2019    Procedure: COLONOSCOPY;  Surgeon: Jesus Manuel Martínez MD;  Location: [facility] ENDOSCOPY;  Service: Gastroenterology    D & C HYSTEROSCOPY N/A 2022    Procedure: DILATATION AND CURETTAGE HYSTEROSCOPY;  Surgeon: Odalys Agudelo MD;  Location: [facility] OR;  Service: Obstetrics/Gynecology;  Laterality: N/A;    DILATATION AND CURETTAGE      LAPAROSCOPIC CHOLECYSTECTOMY      LAS2008    SMALL INTESTINE SURGERY      SBO w/ resection & reanastamosis    TONSILLECTOMY      TUBAL ABDOMINAL LIGATION      VAGINAL DELIVERY      x2    WISDOM TOOTH EXTRACTION  1971       Family History   Problem Relation Age of Onset    Arthritis Mother     Hyperlipidemia Mother     Dementia Mother     Hypertension Mother     Thyroid disease Mother     Cancer Mother         skin    Deep vein thrombosis Mother     Stroke Mother     Hypertension Father     Alzheimer's disease Father     Cancer Father         skin    Melanoma Father     Prostate cancer Father     No Known Problems Sister     No Known Problems Brother     Cancer Maternal Uncle         lung    Cancer Maternal Grandmother         colon    Colon cancer Maternal Grandmother     COPD Maternal Grandfather     Cerebral aneurysm Paternal Grandmother     Alcohol abuse Paternal Grandfather     COPD Paternal Grandfather     Breast cancer Neg Hx     Ovarian cancer Neg Hx        Social History     Socioeconomic History    Marital status:    Tobacco Use    Smoking status: Former     Current packs/day: 0.00     Average packs/day: 0.3 packs/day for 5.0 years (1.3 ttl pk-yrs)     Types: Cigarettes     Start date: 1976     Quit date: 1981     Years since quittin.7    Smokeless tobacco: Never   Vaping Use    Vaping status: Never Used   Substance and Sexual Activity    Alcohol use: Yes     Alcohol/week: 20.0 standard drinks of alcohol     Types: 20 Cans of beer per week     Comment: Patient denies history of etoh abuse    Drug use: No    Sexual activity: Yes      "Partners: Male     Birth control/protection: Surgical         Current Outpatient Medications:     amitriptyline (ELAVIL) 50 MG tablet, Take 1 tablet by mouth Every Night., Disp: , Rfl:     bisacodyl (Dulcolax) 5 MG EC tablet, Take 2 @ 3pm, 2 @ 7 pm day prior to colonoscopy, Disp: 4 tablet, Rfl: 0    busPIRone (BUSPAR) 10 MG tablet, Take 1 tablet by mouth 2 (Two) Times a Day., Disp: 60 tablet, Rfl: 5    lamoTRIgine ER 50 MG tablet sustained-release 24 hour, TAKE 1 TABLET BY MOUTH EVERY DAY WITH 100 MG TABLET, Disp: , Rfl:     Multiple Vitamins-Minerals (CENTRUM SILVER ADULT 50+ PO), Take 1 tablet by mouth Daily., Disp: , Rfl:     Multiple Vitamins-Minerals (OCUVITE ADULT 50+ PO), Take 1 tablet by mouth Daily., Disp: , Rfl:     mupirocin (BACTROBAN) 2 % ointment, Apply 1 Application topically to the appropriate area as directed 3 (Three) Times a Day As Needed (cold sores)., Disp: , Rfl:     Omega-3 350 MG capsule delayed-release, Take 350 mg by mouth Daily. With 1200mg fish oil - combination OTC supplement, Disp: , Rfl:     ondansetron (ZOFRAN) 8 MG tablet, Take 1 tablet by mouth Every 8 (Eight) Hours As Needed for Nausea or Vomiting., Disp: 15 tablet, Rfl: 0    valACYclovir (VALTREX) 500 MG tablet, Take 1 tablet by mouth 2 (Two) Times a Day As Needed., Disp: , Rfl:     No Known Allergies        Objective   /90   Ht 170.2 cm (67.01\")   Wt 53.8 kg (118 lb 9.6 oz)   LMP  (LMP Unknown)   BMI 18.57 kg/m²    BMI is within normal parameters. No other follow-up for BMI required.       Physical Exam  Right Hand Exam     Comments:  Mild tenderness to palpation of the distal wrist overlying the fracture site.  There is no bruising swelling, erythema or gross deformity.  She has grossly full range of motion in the wrist comparable to the contralateral wrist.  Full range of motion in all fingers able to make a composite fist with normal cascade of motion.  There is decreased sensation to light touch over the dorsal " aspect of the right thumb though there is no overt numbness.  Cap refill is brisk to each digit.  Negative Tinel's, negative Phalen's.               Independent Review of Radiographic Studies:    Three-view plain films of the right wrist without in office today for evaluation of fracture healing, comparison views available.  There is a transverse linear lucency through the distal radius which most likely represents a nondisplaced distal radius fracture.  There does not appear to be any articular extension.  Severe degenerative changes of the first CMC joint and scaphoid trapezium joint is noted.  No significant interval changes from previous exam.    Procedures    Assessment and Plan   Diagnoses and all orders for this visit:    1. Other closed extra-articular fracture of distal end of right radius with routine healing, subsequent encounter (Primary)       Discussion of orthopedic goals  Orthopedic activities reviewed and patient expressed appreciation  Regular exercise as tolerated  Risk, benefits, and merits of treatment alternatives reviewed with the patient and questions answered  Patient guided on mobility and conditioning exercises  Ice, heat, and/or modalities as beneficial  Elevate hand for residual swelling  Reduced physical activity as appropriate and avoid offending activity  Weight bearing parameters reviewed  Use brace as instructed  Counseling on diet, nutrition, fitness exercise, and weight reduction goals    Recommendations/Plan:  Exercise, medications, injections, other patient advice, and return appointment as noted.  Patient is encouraged to call or return for any issues or concerns.    Continue current treatment plan.  Check sensation of dorsal right thumb at next visit.  I suspect this decree sensation is from brace wear.  Follow-up in 3 weeks for repeat x-rays and exam.    Return in about 3 weeks (around 9/27/2024) for XOA.  Patient agreeable to call or return sooner for any concerns.

## 2024-09-26 DIAGNOSIS — S52.551D OTHER CLOSED EXTRA-ARTICULAR FRACTURE OF DISTAL END OF RIGHT RADIUS WITH ROUTINE HEALING, SUBSEQUENT ENCOUNTER: Primary | ICD-10-CM

## 2024-10-01 ENCOUNTER — OFFICE VISIT (OUTPATIENT)
Dept: ORTHOPEDIC SURGERY | Facility: CLINIC | Age: 69
End: 2024-10-01
Payer: MEDICARE

## 2024-10-01 VITALS
SYSTOLIC BLOOD PRESSURE: 132 MMHG | DIASTOLIC BLOOD PRESSURE: 80 MMHG | BODY MASS INDEX: 18.71 KG/M2 | TEMPERATURE: 97.7 F | WEIGHT: 119.2 LBS | HEIGHT: 67 IN

## 2024-10-01 DIAGNOSIS — S52.551D OTHER CLOSED EXTRA-ARTICULAR FRACTURE OF DISTAL END OF RIGHT RADIUS WITH ROUTINE HEALING, SUBSEQUENT ENCOUNTER: Primary | ICD-10-CM

## 2024-10-01 PROCEDURE — 1159F MED LIST DOCD IN RCRD: CPT | Performed by: STUDENT IN AN ORGANIZED HEALTH CARE EDUCATION/TRAINING PROGRAM

## 2024-10-01 PROCEDURE — 99213 OFFICE O/P EST LOW 20 MIN: CPT | Performed by: STUDENT IN AN ORGANIZED HEALTH CARE EDUCATION/TRAINING PROGRAM

## 2024-10-01 PROCEDURE — 1160F RVW MEDS BY RX/DR IN RCRD: CPT | Performed by: STUDENT IN AN ORGANIZED HEALTH CARE EDUCATION/TRAINING PROGRAM

## 2024-10-01 NOTE — PROGRESS NOTES
Office Note     Name: Brea Perez    : 1955     MRN: [id number]     Chief Complaint  Follow-up and Fracture of the Right Wrist (Closed extra-articular fracture of distal end of right radius, DOI: 8/10/24.)    Subjective     History of Present Illness:  Brea Perez is a 68 y.o. female presenting today for right wrist distal radius fracture follow-up.  Patient states that she is doing well at this time and she denies any significant pain in her wrist.  She does continue to have mild discomfort at the first CMC joint but states this is very mild.  She does continue to have pins and needles feeling along the dorsal aspect of her thumb but states that it is very mild and does not interfere with anything.  She denies numbness of the thumb.    Review of Systems   Constitutional:  Negative for fever.   HENT:  Negative for dental problem and voice change.    Eyes:  Negative for visual disturbance.   Respiratory:  Negative for shortness of breath.    Cardiovascular:  Negative for chest pain.   Gastrointestinal:  Negative for abdominal pain.   Genitourinary:  Negative for dysuria.   Musculoskeletal:  Positive for arthralgias. Negative for gait problem and joint swelling.   Skin:  Negative for rash.   Neurological:  Negative for speech difficulty.   Hematological:  Does not bruise/bleed easily.   Psychiatric/Behavioral:  Negative for confusion.         Past Medical History:   Diagnosis Date    Abnormal Pap smear of cervix     Anxiety     Chronic constipation     COVID-19 vaccine series completed     Depression     Fibroid, uterine     H/O mammogram 2015    Hearing loss     Reported mild and that she does not have hearing aids at present time    Herpes 10/30/2017    History of colon polyps     History of endometrial biopsy 2012    POST MENOPAUSAL BLEEDING WITH UTERINE LEIOMOMA    History of Papanicolaou smear of cervix 2015    NORMAL     Hyperlipidemia 2015     Post-menopausal atrophic vaginitis     Recurrent urinary tract infection     Seasonal allergies     Varicella     Wears glasses     Rx glasses for reading and watching TV PRN        Past Surgical History:   Procedure Laterality Date    COLON SURGERY  2011    Scar tissue removed secondary to colon surgery that was done in 2003    COLONOSCOPY      COLONOSCOPY N/A 08/13/2019    Procedure: COLONOSCOPY;  Surgeon: Jesus Manuel Martínez MD;  Location: [facility] ENDOSCOPY;  Service: Gastroenterology    D & C HYSTEROSCOPY N/A 08/12/2022    Procedure: DILATATION AND CURETTAGE HYSTEROSCOPY;  Surgeon: Odalys Agudelo MD;  Location: [facility] OR;  Service: Obstetrics/Gynecology;  Laterality: N/A;    DILATATION AND CURETTAGE      LAPAROSCOPIC CHOLECYSTECTOMY  2008    LASIK  2008    SMALL INTESTINE SURGERY  2003    SBO w/ resection & reanastamosis    TONSILLECTOMY      TUBAL ABDOMINAL LIGATION      VAGINAL DELIVERY      x2    WISDOM TOOTH EXTRACTION  1971       Family History   Problem Relation Age of Onset    Arthritis Mother     Hyperlipidemia Mother     Dementia Mother     Hypertension Mother     Thyroid disease Mother     Cancer Mother         skin    Deep vein thrombosis Mother     Stroke Mother     Hypertension Father     Alzheimer's disease Father     Cancer Father         skin    Melanoma Father     Prostate cancer Father     No Known Problems Sister     No Known Problems Brother     Cancer Maternal Uncle         lung    Cancer Maternal Grandmother         colon    Colon cancer Maternal Grandmother     COPD Maternal Grandfather     Cerebral aneurysm Paternal Grandmother     Alcohol abuse Paternal Grandfather     COPD Paternal Grandfather     Breast cancer Neg Hx     Ovarian cancer Neg Hx        Social History     Socioeconomic History    Marital status:    Tobacco Use    Smoking status: Former     Current packs/day: 0.00     Average packs/day: 0.3 packs/day for 5.0 years (1.3 ttl pk-yrs)     Types: Cigarettes     Start date:  "1976     Quit date: 1981     Years since quittin.7    Smokeless tobacco: Never   Vaping Use    Vaping status: Never Used   Substance and Sexual Activity    Alcohol use: Yes     Alcohol/week: 20.0 standard drinks of alcohol     Types: 20 Cans of beer per week     Comment: Patient denies history of etoh abuse    Drug use: No    Sexual activity: Yes     Partners: Male     Birth control/protection: Surgical         Current Outpatient Medications:     amitriptyline (ELAVIL) 50 MG tablet, Take 1 tablet by mouth Every Night., Disp: , Rfl:     bisacodyl (Dulcolax) 5 MG EC tablet, Take 2 @ 3pm, 2 @ 7 pm day prior to colonoscopy, Disp: 4 tablet, Rfl: 0    busPIRone (BUSPAR) 10 MG tablet, Take 1 tablet by mouth 2 (Two) Times a Day., Disp: 60 tablet, Rfl: 5    lamoTRIgine ER 50 MG tablet sustained-release 24 hour, TAKE 1 TABLET BY MOUTH EVERY DAY WITH 100 MG TABLET, Disp: , Rfl:     Multiple Vitamins-Minerals (CENTRUM SILVER ADULT 50+ PO), Take 1 tablet by mouth Daily., Disp: , Rfl:     Multiple Vitamins-Minerals (OCUVITE ADULT 50+ PO), Take 1 tablet by mouth Daily., Disp: , Rfl:     mupirocin (BACTROBAN) 2 % ointment, Apply 1 Application topically to the appropriate area as directed 3 (Three) Times a Day As Needed (cold sores)., Disp: , Rfl:     Omega-3 350 MG capsule delayed-release, Take 350 mg by mouth Daily. With 1200mg fish oil - combination OTC supplement, Disp: , Rfl:     ondansetron (ZOFRAN) 8 MG tablet, Take 1 tablet by mouth Every 8 (Eight) Hours As Needed for Nausea or Vomiting., Disp: 15 tablet, Rfl: 0    valACYclovir (VALTREX) 500 MG tablet, Take 1 tablet by mouth 2 (Two) Times a Day As Needed., Disp: , Rfl:     No Known Allergies        Objective   /80   Temp 97.7 °F (36.5 °C)   Ht 170.2 cm (67.01\")   Wt 54.1 kg (119 lb 3.2 oz)   LMP  (LMP Unknown)   BMI 18.66 kg/m²    BMI is within normal parameters. No other follow-up for BMI required.       Physical Exam  Right Hand Exam     Tenderness "   The patient is experiencing no tenderness.     Range of Motion   The patient has normal right wrist ROM.     Muscle Strength   The patient has normal right wrist strength.    Other   Erythema: absent  Sensation: normal  Pulse: present    Comments:  Positive carpal grind test for first CMC joint.  Gross sensation intact to light touch to the dorsal and volar thumb.  Full range of motion at the MCP and IP joint.           Extremity DVT signs are negative by clinical screen.     Independent Review of Radiographic Studies:    Three-view plain films of the right wrist were done in office today for the evaluation of fracture healing, comparison views available.  There is a healing transverse linear lucency through the distal radius which does not extend into the articular surface.  This represents a nondisplaced distal radius fracture with soft callus noted.  There are severe degenerative changes of the first CMC joint and scaphoid trapezium joint.  No significant interval changes from previous exam.    Procedures    Assessment and Plan   Diagnoses and all orders for this visit:    1. Other closed extra-articular fracture of distal end of right radius with routine healing, subsequent encounter (Primary)       Discussion of orthopedic goals  Orthopedic activities reviewed and patient expressed appreciation  Regular exercise as tolerated  Risk, benefits, and merits of treatment alternatives reviewed with the patient and questions answered  Patient guided on mobility and conditioning exercises  Ice, heat, and/or modalities as beneficial  Weight bearing parameters reviewed  Counseling on diet, nutrition, fitness exercise, and weight reduction goals    Recommendations/Plan:  Exercise, medications, injections, other patient advice, and return appointment as noted.  Patient is encouraged to call or return for any issues or concerns.    Patient appears to have healed well from her distal radius fracture.  Advise she may  discontinue the brace at this time and I advised her progressive return to normal activities.  I advised patient to follow-up with me if she develops any new or worsening symptoms.    Return if symptoms worsen or fail to improve.  Patient agreeable to call or return sooner for any concerns.

## 2025-01-09 ENCOUNTER — TRANSCRIBE ORDERS (OUTPATIENT)
Dept: LAB | Facility: HOSPITAL | Age: 70
End: 2025-01-09
Payer: MEDICARE

## 2025-01-09 ENCOUNTER — LAB (OUTPATIENT)
Dept: LAB | Facility: HOSPITAL | Age: 70
End: 2025-01-09
Payer: MEDICARE

## 2025-01-09 DIAGNOSIS — E55.9 AVITAMINOSIS D: ICD-10-CM

## 2025-01-09 DIAGNOSIS — E78.2 MIXED HYPERLIPIDEMIA: ICD-10-CM

## 2025-01-09 DIAGNOSIS — D50.0 IRON DEFICIENCY ANEMIA SECONDARY TO BLOOD LOSS (CHRONIC): ICD-10-CM

## 2025-01-09 DIAGNOSIS — E53.8 BIOTIN-(PROPIONYL-COA-CARBOXYLASE) LIGASE DEFICIENCY: ICD-10-CM

## 2025-01-09 DIAGNOSIS — R94.6 NONSPECIFIC ABNORMAL RESULTS OF THYROID FUNCTION STUDY: ICD-10-CM

## 2025-01-09 DIAGNOSIS — R94.5 NONSPECIFIC ABNORMAL RESULTS OF LIVER FUNCTION STUDY: Primary | ICD-10-CM

## 2025-01-09 DIAGNOSIS — R94.5 NONSPECIFIC ABNORMAL RESULTS OF LIVER FUNCTION STUDY: ICD-10-CM

## 2025-01-09 LAB
25(OH)D3 SERPL-MCNC: 49.5 NG/ML (ref 30–100)
DEPRECATED RDW RBC AUTO: 40.3 FL (ref 37–54)
ERYTHROCYTE [DISTWIDTH] IN BLOOD BY AUTOMATED COUNT: 11.7 % (ref 12.3–15.4)
HCT VFR BLD AUTO: 38.1 % (ref 34–46.6)
HGB BLD-MCNC: 13.1 G/DL (ref 12–15.9)
MCH RBC QN AUTO: 32.4 PG (ref 26.6–33)
MCHC RBC AUTO-ENTMCNC: 34.4 G/DL (ref 31.5–35.7)
MCV RBC AUTO: 94.3 FL (ref 79–97)
PLATELET # BLD AUTO: 283 10*3/MM3 (ref 140–450)
PMV BLD AUTO: 8.9 FL (ref 6–12)
RBC # BLD AUTO: 4.04 10*6/MM3 (ref 3.77–5.28)
VIT B12 BLD-MCNC: 339 PG/ML (ref 211–946)
WBC NRBC COR # BLD AUTO: 4.57 10*3/MM3 (ref 3.4–10.8)

## 2025-01-09 PROCEDURE — 82607 VITAMIN B-12: CPT

## 2025-01-09 PROCEDURE — 80053 COMPREHEN METABOLIC PANEL: CPT

## 2025-01-09 PROCEDURE — 84443 ASSAY THYROID STIM HORMONE: CPT

## 2025-01-09 PROCEDURE — 84439 ASSAY OF FREE THYROXINE: CPT

## 2025-01-09 PROCEDURE — 84466 ASSAY OF TRANSFERRIN: CPT

## 2025-01-09 PROCEDURE — 82306 VITAMIN D 25 HYDROXY: CPT

## 2025-01-09 PROCEDURE — 83540 ASSAY OF IRON: CPT

## 2025-01-09 PROCEDURE — 36415 COLL VENOUS BLD VENIPUNCTURE: CPT

## 2025-01-09 PROCEDURE — 85027 COMPLETE CBC AUTOMATED: CPT

## 2025-01-09 PROCEDURE — 80061 LIPID PANEL: CPT

## 2025-01-10 LAB
ALBUMIN SERPL-MCNC: 4.7 G/DL (ref 3.5–5.2)
ALBUMIN/GLOB SERPL: 1.8 G/DL
ALP SERPL-CCNC: 103 U/L (ref 39–117)
ALT SERPL W P-5'-P-CCNC: 19 U/L (ref 1–33)
ANION GAP SERPL CALCULATED.3IONS-SCNC: 13 MMOL/L (ref 5–15)
AST SERPL-CCNC: 27 U/L (ref 1–32)
BILIRUB SERPL-MCNC: 0.5 MG/DL (ref 0–1.2)
BUN SERPL-MCNC: 12 MG/DL (ref 8–23)
BUN/CREAT SERPL: 17.1 (ref 7–25)
CALCIUM SPEC-SCNC: 9.5 MG/DL (ref 8.6–10.5)
CHLORIDE SERPL-SCNC: 98 MMOL/L (ref 98–107)
CHOLEST SERPL-MCNC: 222 MG/DL (ref 0–200)
CO2 SERPL-SCNC: 27 MMOL/L (ref 22–29)
CREAT SERPL-MCNC: 0.7 MG/DL (ref 0.57–1)
EGFRCR SERPLBLD CKD-EPI 2021: 94.9 ML/MIN/1.73
GLOBULIN UR ELPH-MCNC: 2.6 GM/DL
GLUCOSE SERPL-MCNC: 84 MG/DL (ref 65–99)
HDLC SERPL-MCNC: 94 MG/DL (ref 40–60)
IRON 24H UR-MRATE: 144 MCG/DL (ref 37–145)
IRON SATN MFR SERPL: 38 % (ref 20–50)
LDLC SERPL CALC-MCNC: 119 MG/DL (ref 0–100)
LDLC/HDLC SERPL: 1.26 {RATIO}
POTASSIUM SERPL-SCNC: 4.1 MMOL/L (ref 3.5–5.2)
PROT SERPL-MCNC: 7.3 G/DL (ref 6–8.5)
SODIUM SERPL-SCNC: 138 MMOL/L (ref 136–145)
T4 FREE SERPL-MCNC: 0.85 NG/DL (ref 0.92–1.68)
TIBC SERPL-MCNC: 377 MCG/DL (ref 298–536)
TRANSFERRIN SERPL-MCNC: 253 MG/DL (ref 200–360)
TRIGL SERPL-MCNC: 50 MG/DL (ref 0–150)
TSH SERPL DL<=0.05 MIU/L-ACNC: 5.49 UIU/ML (ref 0.27–4.2)
VLDLC SERPL-MCNC: 9 MG/DL (ref 5–40)

## 2025-03-17 ENCOUNTER — LAB (OUTPATIENT)
Dept: LAB | Facility: HOSPITAL | Age: 70
End: 2025-03-17
Payer: MEDICARE

## 2025-03-17 ENCOUNTER — TRANSCRIBE ORDERS (OUTPATIENT)
Dept: LAB | Facility: HOSPITAL | Age: 70
End: 2025-03-17
Payer: MEDICARE

## 2025-03-17 DIAGNOSIS — E03.8 TSH DEFICIENCY: Primary | ICD-10-CM

## 2025-03-17 DIAGNOSIS — E03.8 TSH DEFICIENCY: ICD-10-CM

## 2025-03-17 LAB
T4 FREE SERPL-MCNC: 0.91 NG/DL (ref 0.92–1.68)
TSH SERPL DL<=0.05 MIU/L-ACNC: 3.67 UIU/ML (ref 0.27–4.2)

## 2025-03-17 PROCEDURE — 84439 ASSAY OF FREE THYROXINE: CPT

## 2025-03-17 PROCEDURE — 36415 COLL VENOUS BLD VENIPUNCTURE: CPT

## 2025-03-17 PROCEDURE — 84443 ASSAY THYROID STIM HORMONE: CPT

## 2025-04-17 ENCOUNTER — TRANSCRIBE ORDERS (OUTPATIENT)
Dept: ADMINISTRATIVE | Facility: HOSPITAL | Age: 70
End: 2025-04-17
Payer: MEDICARE

## 2025-04-17 DIAGNOSIS — Z12.31 VISIT FOR SCREENING MAMMOGRAM: Primary | ICD-10-CM

## 2025-05-21 ENCOUNTER — OFFICE VISIT (OUTPATIENT)
Dept: OBSTETRICS AND GYNECOLOGY | Facility: CLINIC | Age: 70
End: 2025-05-21
Payer: MEDICARE

## 2025-05-21 VITALS
HEIGHT: 66 IN | BODY MASS INDEX: 19.61 KG/M2 | WEIGHT: 122 LBS | SYSTOLIC BLOOD PRESSURE: 116 MMHG | DIASTOLIC BLOOD PRESSURE: 60 MMHG

## 2025-05-21 DIAGNOSIS — N95.2 POSTMENOPAUSAL ATROPHIC VAGINITIS: ICD-10-CM

## 2025-05-21 DIAGNOSIS — R39.198 DIFFICULTY VOIDING: ICD-10-CM

## 2025-05-21 DIAGNOSIS — Z12.31 ENCOUNTER FOR SCREENING MAMMOGRAM FOR BREAST CANCER: ICD-10-CM

## 2025-05-21 DIAGNOSIS — Z01.411 ENCOUNTER FOR GYNECOLOGICAL EXAMINATION (GENERAL) (ROUTINE) WITH ABNORMAL FINDINGS: Primary | ICD-10-CM

## 2025-05-21 DIAGNOSIS — M85.852 OSTEOPENIA OF LEFT HIP: ICD-10-CM

## 2025-05-21 DIAGNOSIS — N36.2 URETHRAL CARUNCLE: ICD-10-CM

## 2025-05-21 RX ORDER — LEVOTHYROXINE SODIUM 50 UG/1
TABLET ORAL
COMMUNITY
Start: 2025-05-08

## 2025-05-21 RX ORDER — ESTRADIOL 0.1 MG/G
CREAM VAGINAL
Qty: 1 EACH | Refills: 11 | Status: SHIPPED | OUTPATIENT
Start: 2025-05-21

## 2025-05-21 NOTE — PROGRESS NOTES
Chief Complaint  Gynecologic Exam     History of Present Illness:  Patient is 69 y.o.  who presents to [facility] here for her annual examination.  Patient had her last Pap smear in .  Patient did have mammogram in May of last year.  She has one scheduled for the end of .  She did have bone density scan last year as well.  She also had colonoscopy in  and is to repeat in 3 years.  She is taking her calcium and vitamin D supplements.  She is not on any estrogen cream.  She denies any vaginal bleeding or spotting.  She does report having difficulty with voiding.  She will occasionally have leaking after voiding as well as hesitancy.  She has not had any recent UTIs.    History  Past Medical History:   Diagnosis Date    Abnormal Pap smear of cervix     Anxiety     Chronic constipation     Colon polyp 2015    COVID-19 vaccine series completed     Depression     Failure to thrive (child)     Fibroid, uterine     H/O mammogram 2015    Hearing loss     Reported mild and that she does not have hearing aids at present time    Herpes 10/30/2017    History of colon polyps     History of endometrial biopsy 2012    POST MENOPAUSAL BLEEDING WITH UTERINE LEIOMOMA    History of Papanicolaou smear of cervix 2015    NORMAL     Hyperlipidemia 2015    Multiple gestation     Post-menopausal atrophic vaginitis     Recurrent urinary tract infection     Seasonal allergies     Varicella     Visual impairment 2017    Wears glasses     Rx glasses for reading and watching TV PRN     Current Outpatient Medications on File Prior to Visit   Medication Sig Dispense Refill    levothyroxine (SYNTHROID, LEVOTHROID) 50 MCG tablet TAKE 1 TABLET BY MOUTH DAILY. DISCONTINUE 25 MCG PRESCRIPTION PLEASE      amitriptyline (ELAVIL) 50 MG tablet Take 1 tablet by mouth Every Night.      bisacodyl (Dulcolax) 5 MG EC tablet Take 2 @ 3pm, 2 @ 7 pm day prior to colonoscopy 4  tablet 0    busPIRone (BUSPAR) 10 MG tablet Take 1 tablet by mouth 2 (Two) Times a Day. 60 tablet 5    lamoTRIgine ER 50 MG tablet sustained-release 24 hour TAKE 1 TABLET BY MOUTH EVERY DAY WITH 100 MG TABLET      Multiple Vitamins-Minerals (CENTRUM SILVER ADULT 50+ PO) Take 1 tablet by mouth Daily.      Multiple Vitamins-Minerals (OCUVITE ADULT 50+ PO) Take 1 tablet by mouth Daily.      mupirocin (BACTROBAN) 2 % ointment Apply 1 Application topically to the appropriate area as directed 3 (Three) Times a Day As Needed (cold sores).      Omega-3 350 MG capsule delayed-release Take 350 mg by mouth Daily. With 1200mg fish oil - combination OTC supplement      ondansetron (ZOFRAN) 8 MG tablet Take 1 tablet by mouth Every 8 (Eight) Hours As Needed for Nausea or Vomiting. 15 tablet 0    valACYclovir (VALTREX) 500 MG tablet Take 1 tablet by mouth 2 (Two) Times a Day As Needed.       No current facility-administered medications on file prior to visit.     No Known Allergies  Past Surgical History:   Procedure Laterality Date    COLON SURGERY  2011    Scar tissue removed secondary to colon surgery that was done in 2003    COLONOSCOPY      COLONOSCOPY N/A 08/13/2019    Procedure: COLONOSCOPY;  Surgeon: Jesus Manuel Martínez MD;  Location: [facility];  Service: Gastroenterology    D & C HYSTEROSCOPY N/A 08/12/2022    Procedure: DILATATION AND CURETTAGE HYSTEROSCOPY;  Surgeon: Odalys Agudelo MD;  Location: [facility] OR;  Service: Obstetrics/Gynecology;  Laterality: N/A;    DILATATION AND CURETTAGE      LAPAROSCOPIC CHOLECYSTECTOMY  2008    LASIK 2008    SMALL INTESTINE SURGERY  2003    SBO w/ resection & reanastamosis    TONSILLECTOMY      TUBAL ABDOMINAL LIGATION      VAGINAL DELIVERY      x2    WISDOM TOOTH EXTRACTION  1971     Family History   Problem Relation Age of Onset    Arthritis Mother     Hyperlipidemia Mother     Dementia Mother     Hypertension Mother     Thyroid disease Mother     Cancer Mother         skin    Deep  "vein thrombosis Mother     Stroke Mother     Hearing loss Mother     Hypertension Father     Alzheimer's disease Father     Cancer Father         skin    Melanoma Father     Prostate cancer Father     Hearing loss Father     No Known Problems Sister     No Known Problems Brother     Cancer Maternal Uncle         lung    Cancer Maternal Grandmother         colon    Colon cancer Maternal Grandmother     Vision loss Maternal Grandmother     COPD Maternal Grandfather     Cerebral aneurysm Paternal Grandmother     Alcohol abuse Paternal Grandfather     COPD Paternal Grandfather     Cancer Maternal Uncle     Miscarriages / Stillbirths Sister     Breast cancer Neg Hx     Ovarian cancer Neg Hx      Social History     Socioeconomic History    Marital status:    Tobacco Use    Smoking status: Former     Current packs/day: 0.00     Average packs/day: 0.3 packs/day for 5.0 years (1.3 ttl pk-yrs)     Types: Cigarettes     Start date: 1976     Quit date: 1981     Years since quittin.4    Smokeless tobacco: Never   Vaping Use    Vaping status: Never Used   Substance and Sexual Activity    Alcohol use: Yes     Alcohol/week: 20.0 standard drinks of alcohol     Types: 20 Cans of beer per week     Comment: Patient denies history of etoh abuse    Drug use: No    Sexual activity: Yes     Partners: Male     Birth control/protection: Surgical       Physical Examination:  Vital Signs: /60   Ht 167.6 cm (66\")   Wt 55.3 kg (122 lb)   BMI 19.69 kg/m²     General Appearance: alert, appears stated age, and cooperative  Breasts: Examined in supine position  Symmetric without masses or skin dimpling  Nipples normal without inversion, lesions or discharge  There are no palpable axillary nodes  Abdomen: no masses, no hepatomegaly, no splenomegaly, soft non-tender, no guarding, and no rebound tenderness  Pelvic: Clinical staff was present for exam; pelvic examination required for evaluation.  External genitalia:  normal " appearance of the external genitalia including Bartholin's and Molalla's glands.  :  urethral meatus with caruncle noted  Vaginal: Atrophic changes noted  Cervix:  normal appearance.  Uterus:  normal size, shape and consistency.  Adnexa:  normal bimanual exam of the adnexa.  Pap smear done and specimen sent using Thin-Prep technique    Data Review:  The following data was reviewed by: Odalys Agudelo MD on 05/21/2025:     Labs:    Imaging:  Mammo Screening Digital Tomosynthesis Bilateral With CAD (05/28/2024 11:41)  DEXA Bone Density Axial (09/03/2024 13:42)  Medical Records:  External Facility Surgical / Procedural Request (03/27/2023 09:28)     Assessment and Plan   1. Encounter for gynecological examination (general) (routine) with abnormal findings  I explained to Brea that Pap smears are no longer recommended in patients after 65 years of age who have had adequate negative screening with three consecutive negative pap smears within the previous 10 years and the most recent test performed within the past 5 years. Women who have a history of OWEN 2, OWEN 3, or ACIS should continue routine screening for a total of 20 years after regression or treatment.   I stressed to Brea that she still should be seen yearly for a full physical including breast and pelvic exam.  I informed her that women aged 65 and older still do get cervical cancer representing 14.1% of the [place] female population and 19.6% of new cases of cervical cancer.  I also informed the patient regarding medicare guidelines on coverage for pap smear screening.  For this reason, Brea has elected pap smear screening this year.   - LIQUID-BASED PAP SMEAR WITH HPV GENOTYPING IF ASCUS (MARCIO,COR,MAD)    2. Encounter for screening mammogram for breast cancer  It is recommended per ACOG, for women at average risk to start annual mammogram screening at the age of 40 until the age of 75 and an individualized decision be made for women after age 75.  She was encouraged  to continue getting yearly mammograms.  She should report any palpable breast lump(s) or skin changes regardless of mammographic findings.  I explained to Brea that notification regarding her mammogram results will come from the center performing the study.  Our office will not be routinely calling with mammogram results.  It is her responsibility to make sure that the results from the mammogram are communicated to her by the breast center.  If she has any questions about the results, she is welcome to call our office anytime.  The patient reports she has mammogram scheduled.    Brea was counseled regarding having clinical breast exams and breast self-awareness.  Women aged 29-39 years of age should have clinical breast exams every 1-3 years and yearly aged 40 and older.  The patient was counseled regarding breast self-awareness focusing on having a sense of what is normal for her breasts so that she can tell if there are changes.  Even small changes should be reported to provider.     3. Osteopenia of left hip  Patient instructed in the use of her calcium 1200 mg daily in divided doses as well as vitamin D.  Repeat DEXA scan next year.    4. Postmenopausal atrophic vaginitis  Discussed various options for relief of atrophic vaginal symptoms related to menopause. Discussed local therapy for treatment of vaginal symptoms only.  Discussed the different formulation options including cream, ring, and tablets.  Discussed the low risk of systemic absorption in postmenopausal women with atrophy using 25 mcgs of estradiol on a daily basis.  Recommend low dose use 2-3x/wk for maintenance of treatment.  Other treatment options were discussed including the use of water-based and silicone-based vaginal lubricants and moisturizers.  Also discussed was the FDA approved treatment option of ospemifene for moderate to severe dyspareunia.   - estradiol (ESTRACE VAGINAL) 0.1 MG/GM vaginal cream; May use 1 gram intravaginal qhs x 2  weeks then 1 gram 2x/week.  Dispense: 1 each; Refill: 11    5. Difficulty voiding  Rx given as noted.   Patient to apply to the periurethral area as discussed.  - estradiol (ESTRACE VAGINAL) 0.1 MG/GM vaginal cream; May use 1 gram intravaginal qhs x 2 weeks then 1 gram 2x/week.  Dispense: 1 each; Refill: 11    6. Urethral caruncle  The patient presents with urethral caruncle as noted on examination.  The patient was informed regarding the findings.  She was informed that a caruncle is eversion of a portion of the distal urethra usually along the posterior edge as noted.  She was informed this usually occurs in women in the postmenopausal state and is related to hypoestrogenism.  She was informed this can be a source of bleeding as well as dysuria, pain, or obstruction to urine flow.  She was informed to apply the estrogen cream to the periurethra daily for two weeks then two times per week thereafter.   - estradiol (ESTRACE VAGINAL) 0.1 MG/GM vaginal cream; May use 1 gram intravaginal qhs x 2 weeks then 1 gram 2x/week.  Dispense: 1 each; Refill: 11    Follow Up/Instructions:  Follow up as noted.  Patient was given instructions and counseling regarding her condition or for health maintenance advice. Please see specific information pulled into the AVS if appropriate.     Note: Speech recognition transcription software may have been used to dictate portions of this document.  An attempt at proofreading has been made though minor errors in transcription may still be present.    This note was electronically signed.  Odalys Agudelo M.D.

## 2025-05-23 LAB — REF LAB TEST METHOD: NORMAL

## 2025-06-26 ENCOUNTER — HOSPITAL ENCOUNTER (OUTPATIENT)
Dept: MAMMOGRAPHY | Facility: HOSPITAL | Age: 70
Discharge: HOME | End: 2025-06-26
Admitting: OBSTETRICS & GYNECOLOGY
Payer: MEDICARE

## 2025-06-26 DIAGNOSIS — Z12.31 VISIT FOR SCREENING MAMMOGRAM: ICD-10-CM

## 2025-06-26 PROCEDURE — 77063 BREAST TOMOSYNTHESIS BI: CPT

## 2025-06-26 PROCEDURE — 77067 SCR MAMMO BI INCL CAD: CPT

## (undated) DEVICE — SUT GUT CHRM 2/0 SH 27IN G123H

## (undated) DEVICE — GLV SURG SENSICARE W/ALOE PF LF 8.5 STRL

## (undated) DEVICE — Device: Brand: DEFENDO AIR/WATER/SUCTION AND BIOPSY VALVE

## (undated) DEVICE — GLV SURG BIOGEL M LTX PF 6 1/2

## (undated) DEVICE — HYBRID TUBING/CAP SET FOR OLYMPUS® SCOPES: Brand: ERBE

## (undated) DEVICE — PAD GRND REM POLYHESIVE A/ DISP

## (undated) DEVICE — LUBE JELLY PK/2.75GM STRL BX/144

## (undated) DEVICE — SOL IRR H2O BTL 1000ML STRL

## (undated) DEVICE — SOL NACL 0.9PCT 1000ML

## (undated) DEVICE — Device

## (undated) DEVICE — ENDOSCOPY PORT CONNECTOR FOR OLYMPUS® SCOPES: Brand: ERBE

## (undated) DEVICE — ST IRR CYSTO W/SPK 77IN LF

## (undated) DEVICE — RICH MINOR LITHOTOMY: Brand: MEDLINE INDUSTRIES, INC.